# Patient Record
Sex: MALE | Race: WHITE | Employment: FULL TIME | URBAN - METROPOLITAN AREA
[De-identification: names, ages, dates, MRNs, and addresses within clinical notes are randomized per-mention and may not be internally consistent; named-entity substitution may affect disease eponyms.]

---

## 2017-05-26 ENCOUNTER — OFFICE VISIT (OUTPATIENT)
Dept: FAMILY MEDICINE CLINIC | Age: 62
End: 2017-05-26

## 2017-05-26 VITALS
SYSTOLIC BLOOD PRESSURE: 122 MMHG | RESPIRATION RATE: 16 BRPM | HEART RATE: 68 BPM | TEMPERATURE: 98.4 F | OXYGEN SATURATION: 98 % | BODY MASS INDEX: 27.63 KG/M2 | HEIGHT: 72 IN | DIASTOLIC BLOOD PRESSURE: 74 MMHG | WEIGHT: 204 LBS

## 2017-05-26 DIAGNOSIS — I10 ESSENTIAL HYPERTENSION: ICD-10-CM

## 2017-05-26 PROCEDURE — 99213 OFFICE O/P EST LOW 20 MIN: CPT | Performed by: FAMILY MEDICINE

## 2017-05-26 RX ORDER — HYDROCHLOROTHIAZIDE 25 MG/1
25 TABLET ORAL DAILY
Qty: 90 TABLET | Refills: 1 | Status: SHIPPED | OUTPATIENT
Start: 2017-05-26 | End: 2017-11-17 | Stop reason: SDUPTHER

## 2017-05-26 RX ORDER — ATENOLOL 100 MG/1
100 TABLET ORAL DAILY
Qty: 90 TABLET | Refills: 1 | Status: SHIPPED | OUTPATIENT
Start: 2017-05-26 | End: 2017-11-17 | Stop reason: SDUPTHER

## 2017-09-12 ENCOUNTER — OFFICE VISIT (OUTPATIENT)
Dept: FAMILY MEDICINE CLINIC | Age: 62
End: 2017-09-12

## 2017-09-12 VITALS
RESPIRATION RATE: 16 BRPM | WEIGHT: 204 LBS | HEIGHT: 72 IN | TEMPERATURE: 98 F | BODY MASS INDEX: 27.63 KG/M2 | OXYGEN SATURATION: 98 % | SYSTOLIC BLOOD PRESSURE: 118 MMHG | DIASTOLIC BLOOD PRESSURE: 80 MMHG | HEART RATE: 66 BPM

## 2017-09-12 DIAGNOSIS — K52.9 ACUTE GASTROENTERITIS: Primary | ICD-10-CM

## 2017-09-12 PROCEDURE — 99213 OFFICE O/P EST LOW 20 MIN: CPT | Performed by: FAMILY MEDICINE

## 2017-11-17 ENCOUNTER — OFFICE VISIT (OUTPATIENT)
Dept: FAMILY MEDICINE CLINIC | Age: 62
End: 2017-11-17

## 2017-11-17 VITALS
DIASTOLIC BLOOD PRESSURE: 80 MMHG | BODY MASS INDEX: 27.63 KG/M2 | HEIGHT: 72 IN | WEIGHT: 204 LBS | TEMPERATURE: 98.7 F | HEART RATE: 65 BPM | SYSTOLIC BLOOD PRESSURE: 124 MMHG | OXYGEN SATURATION: 97 % | RESPIRATION RATE: 16 BRPM

## 2017-11-17 DIAGNOSIS — I10 ESSENTIAL HYPERTENSION: ICD-10-CM

## 2017-11-17 PROCEDURE — 3017F COLORECTAL CA SCREEN DOC REV: CPT | Performed by: FAMILY MEDICINE

## 2017-11-17 PROCEDURE — G8427 DOCREV CUR MEDS BY ELIG CLIN: HCPCS | Performed by: FAMILY MEDICINE

## 2017-11-17 PROCEDURE — G8484 FLU IMMUNIZE NO ADMIN: HCPCS | Performed by: FAMILY MEDICINE

## 2017-11-17 PROCEDURE — G8417 CALC BMI ABV UP PARAM F/U: HCPCS | Performed by: FAMILY MEDICINE

## 2017-11-17 PROCEDURE — 4004F PT TOBACCO SCREEN RCVD TLK: CPT | Performed by: FAMILY MEDICINE

## 2017-11-17 PROCEDURE — 99213 OFFICE O/P EST LOW 20 MIN: CPT | Performed by: FAMILY MEDICINE

## 2017-11-17 RX ORDER — ATENOLOL 100 MG/1
100 TABLET ORAL DAILY
Qty: 90 TABLET | Refills: 3 | Status: SHIPPED | OUTPATIENT
Start: 2017-11-17 | End: 2018-11-15 | Stop reason: SDUPTHER

## 2017-11-17 RX ORDER — HYDROCHLOROTHIAZIDE 25 MG/1
25 TABLET ORAL DAILY
Qty: 90 TABLET | Refills: 3 | Status: SHIPPED | OUTPATIENT
Start: 2017-11-17 | End: 2018-11-15 | Stop reason: SDUPTHER

## 2018-04-03 ENCOUNTER — OFFICE VISIT (OUTPATIENT)
Dept: URGENT CARE | Age: 63
End: 2018-04-03

## 2018-04-03 VITALS
WEIGHT: 200 LBS | RESPIRATION RATE: 16 BRPM | DIASTOLIC BLOOD PRESSURE: 84 MMHG | TEMPERATURE: 97.7 F | HEIGHT: 72 IN | SYSTOLIC BLOOD PRESSURE: 126 MMHG | BODY MASS INDEX: 27.09 KG/M2 | OXYGEN SATURATION: 98 % | HEART RATE: 66 BPM

## 2018-04-03 DIAGNOSIS — R10.30 LOWER ABDOMINAL PAIN: Primary | ICD-10-CM

## 2018-04-03 LAB
APPEARANCE FLUID: CLEAR
BILIRUBIN, POC: NEGATIVE
BLOOD URINE, POC: NEGATIVE
CLARITY, POC: CLEAR
COLOR, POC: YELLOW
GLUCOSE URINE, POC: NEGATIVE
KETONES, POC: NEGATIVE
LEUKOCYTE EST, POC: NEGATIVE
NITRITE, POC: NEGATIVE
PH, POC: 7.5
PROTEIN, POC: NEGATIVE
SPECIFIC GRAVITY, POC: 1
UROBILINOGEN, POC: 0.2

## 2018-04-03 PROCEDURE — 99204 OFFICE O/P NEW MOD 45 MIN: CPT | Performed by: EMERGENCY MEDICINE

## 2018-04-03 PROCEDURE — G8427 DOCREV CUR MEDS BY ELIG CLIN: HCPCS | Performed by: EMERGENCY MEDICINE

## 2018-04-03 PROCEDURE — 81002 URINALYSIS NONAUTO W/O SCOPE: CPT | Performed by: EMERGENCY MEDICINE

## 2018-04-03 PROCEDURE — 4004F PT TOBACCO SCREEN RCVD TLK: CPT | Performed by: EMERGENCY MEDICINE

## 2018-04-03 PROCEDURE — 3017F COLORECTAL CA SCREEN DOC REV: CPT | Performed by: EMERGENCY MEDICINE

## 2018-04-03 PROCEDURE — G8417 CALC BMI ABV UP PARAM F/U: HCPCS | Performed by: EMERGENCY MEDICINE

## 2018-04-03 ASSESSMENT — ENCOUNTER SYMPTOMS
VOMITING: 0
DIARRHEA: 0
ABDOMINAL PAIN: 1
NAUSEA: 0

## 2018-05-03 ENCOUNTER — OFFICE VISIT (OUTPATIENT)
Dept: FAMILY MEDICINE CLINIC | Age: 63
End: 2018-05-03

## 2018-05-03 VITALS
SYSTOLIC BLOOD PRESSURE: 118 MMHG | HEIGHT: 72 IN | BODY MASS INDEX: 27.5 KG/M2 | OXYGEN SATURATION: 97 % | HEART RATE: 58 BPM | WEIGHT: 203 LBS | DIASTOLIC BLOOD PRESSURE: 72 MMHG

## 2018-05-03 DIAGNOSIS — I10 ESSENTIAL HYPERTENSION: Primary | ICD-10-CM

## 2018-05-03 DIAGNOSIS — K57.90 DIVERTICULOSIS OF INTESTINE WITHOUT BLEEDING, UNSPECIFIED INTESTINAL TRACT LOCATION: ICD-10-CM

## 2018-05-03 DIAGNOSIS — M71.21 BAKER'S CYST OF KNEE, RIGHT: ICD-10-CM

## 2018-05-03 DIAGNOSIS — Z72.0 TOBACCO USE: ICD-10-CM

## 2018-05-03 PROCEDURE — 99213 OFFICE O/P EST LOW 20 MIN: CPT | Performed by: PHYSICIAN ASSISTANT

## 2018-05-03 ASSESSMENT — ENCOUNTER SYMPTOMS
BLOOD IN STOOL: 0
VOMITING: 0
ABDOMINAL DISTENTION: 0
COUGH: 0
CHOKING: 0
WHEEZING: 0
CHEST TIGHTNESS: 0
APNEA: 0
RECTAL PAIN: 0
STRIDOR: 0
CONSTIPATION: 0
NAUSEA: 0
DIARRHEA: 0
SHORTNESS OF BREATH: 0
ABDOMINAL PAIN: 1

## 2018-05-03 ASSESSMENT — PATIENT HEALTH QUESTIONNAIRE - PHQ9
SUM OF ALL RESPONSES TO PHQ QUESTIONS 1-9: 0
SUM OF ALL RESPONSES TO PHQ9 QUESTIONS 1 & 2: 0
1. LITTLE INTEREST OR PLEASURE IN DOING THINGS: 0
2. FEELING DOWN, DEPRESSED OR HOPELESS: 0

## 2018-11-15 ENCOUNTER — OFFICE VISIT (OUTPATIENT)
Dept: FAMILY MEDICINE CLINIC | Age: 63
End: 2018-11-15
Payer: COMMERCIAL

## 2018-11-15 VITALS
OXYGEN SATURATION: 97 % | SYSTOLIC BLOOD PRESSURE: 110 MMHG | BODY MASS INDEX: 28.17 KG/M2 | WEIGHT: 208 LBS | HEART RATE: 65 BPM | DIASTOLIC BLOOD PRESSURE: 84 MMHG | HEIGHT: 72 IN

## 2018-11-15 DIAGNOSIS — R73.01 ELEVATED FASTING GLUCOSE: ICD-10-CM

## 2018-11-15 DIAGNOSIS — I10 ESSENTIAL HYPERTENSION: Primary | ICD-10-CM

## 2018-11-15 LAB — HBA1C MFR BLD: 5.6 %

## 2018-11-15 PROCEDURE — 3017F COLORECTAL CA SCREEN DOC REV: CPT | Performed by: PHYSICIAN ASSISTANT

## 2018-11-15 PROCEDURE — G8427 DOCREV CUR MEDS BY ELIG CLIN: HCPCS | Performed by: PHYSICIAN ASSISTANT

## 2018-11-15 PROCEDURE — 83036 HEMOGLOBIN GLYCOSYLATED A1C: CPT | Performed by: PHYSICIAN ASSISTANT

## 2018-11-15 PROCEDURE — G8484 FLU IMMUNIZE NO ADMIN: HCPCS | Performed by: PHYSICIAN ASSISTANT

## 2018-11-15 PROCEDURE — 99213 OFFICE O/P EST LOW 20 MIN: CPT | Performed by: PHYSICIAN ASSISTANT

## 2018-11-15 PROCEDURE — G8417 CALC BMI ABV UP PARAM F/U: HCPCS | Performed by: PHYSICIAN ASSISTANT

## 2018-11-15 PROCEDURE — 4004F PT TOBACCO SCREEN RCVD TLK: CPT | Performed by: PHYSICIAN ASSISTANT

## 2018-11-15 RX ORDER — ATENOLOL 100 MG/1
100 TABLET ORAL DAILY
Qty: 90 TABLET | Refills: 1 | Status: SHIPPED | OUTPATIENT
Start: 2018-11-15 | End: 2019-05-28 | Stop reason: SDUPTHER

## 2018-11-15 RX ORDER — HYDROCHLOROTHIAZIDE 25 MG/1
25 TABLET ORAL DAILY
Qty: 90 TABLET | Refills: 1 | Status: SHIPPED | OUTPATIENT
Start: 2018-11-15 | End: 2019-05-28 | Stop reason: SDUPTHER

## 2018-11-15 ASSESSMENT — ENCOUNTER SYMPTOMS
SHORTNESS OF BREATH: 0
COUGH: 0
CHEST TIGHTNESS: 0

## 2019-03-27 ENCOUNTER — OFFICE VISIT (OUTPATIENT)
Dept: FAMILY MEDICINE CLINIC | Age: 64
End: 2019-03-27
Payer: COMMERCIAL

## 2019-03-27 VITALS
DIASTOLIC BLOOD PRESSURE: 80 MMHG | HEART RATE: 65 BPM | WEIGHT: 205.2 LBS | OXYGEN SATURATION: 98 % | BODY MASS INDEX: 27.79 KG/M2 | SYSTOLIC BLOOD PRESSURE: 114 MMHG | HEIGHT: 72 IN

## 2019-03-27 DIAGNOSIS — M54.50 ACUTE BILATERAL LOW BACK PAIN WITHOUT SCIATICA: ICD-10-CM

## 2019-03-27 DIAGNOSIS — R25.1 TREMOR: Primary | ICD-10-CM

## 2019-03-27 PROCEDURE — 99213 OFFICE O/P EST LOW 20 MIN: CPT | Performed by: PHYSICIAN ASSISTANT

## 2019-03-27 PROCEDURE — G0444 DEPRESSION SCREEN ANNUAL: HCPCS | Performed by: PHYSICIAN ASSISTANT

## 2019-03-27 RX ORDER — METHYLPREDNISOLONE 4 MG/1
TABLET ORAL
Qty: 1 KIT | Refills: 0 | Status: SHIPPED | OUTPATIENT
Start: 2019-03-27 | End: 2019-04-02

## 2019-03-27 ASSESSMENT — ENCOUNTER SYMPTOMS
BOWEL INCONTINENCE: 0
CHEST TIGHTNESS: 0
ABDOMINAL PAIN: 0
BACK PAIN: 1
SHORTNESS OF BREATH: 0

## 2019-03-27 ASSESSMENT — PATIENT HEALTH QUESTIONNAIRE - PHQ9
9. THOUGHTS THAT YOU WOULD BE BETTER OFF DEAD, OR OF HURTING YOURSELF: 0
SUM OF ALL RESPONSES TO PHQ9 QUESTIONS 1 & 2: 3
5. POOR APPETITE OR OVEREATING: 0
10. IF YOU CHECKED OFF ANY PROBLEMS, HOW DIFFICULT HAVE THESE PROBLEMS MADE IT FOR YOU TO DO YOUR WORK, TAKE CARE OF THINGS AT HOME, OR GET ALONG WITH OTHER PEOPLE: 0
3. TROUBLE FALLING OR STAYING ASLEEP: 0
7. TROUBLE CONCENTRATING ON THINGS, SUCH AS READING THE NEWSPAPER OR WATCHING TELEVISION: 0
8. MOVING OR SPEAKING SO SLOWLY THAT OTHER PEOPLE COULD HAVE NOTICED. OR THE OPPOSITE, BEING SO FIGETY OR RESTLESS THAT YOU HAVE BEEN MOVING AROUND A LOT MORE THAN USUAL: 0
6. FEELING BAD ABOUT YOURSELF - OR THAT YOU ARE A FAILURE OR HAVE LET YOURSELF OR YOUR FAMILY DOWN: 3
4. FEELING TIRED OR HAVING LITTLE ENERGY: 0
SUM OF ALL RESPONSES TO PHQ QUESTIONS 1-9: 6
1. LITTLE INTEREST OR PLEASURE IN DOING THINGS: 0
2. FEELING DOWN, DEPRESSED OR HOPELESS: 3
SUM OF ALL RESPONSES TO PHQ QUESTIONS 1-9: 6

## 2019-05-28 ENCOUNTER — INITIAL CONSULT (OUTPATIENT)
Dept: NEUROLOGY | Age: 64
End: 2019-05-28
Payer: COMMERCIAL

## 2019-05-28 VITALS
WEIGHT: 206 LBS | HEIGHT: 72 IN | HEART RATE: 68 BPM | SYSTOLIC BLOOD PRESSURE: 127 MMHG | DIASTOLIC BLOOD PRESSURE: 79 MMHG | BODY MASS INDEX: 27.9 KG/M2 | OXYGEN SATURATION: 95 %

## 2019-05-28 DIAGNOSIS — I10 HTN (HYPERTENSION), BENIGN: ICD-10-CM

## 2019-05-28 DIAGNOSIS — I10 ESSENTIAL HYPERTENSION: Primary | ICD-10-CM

## 2019-05-28 DIAGNOSIS — I10 ESSENTIAL HYPERTENSION: ICD-10-CM

## 2019-05-28 DIAGNOSIS — G25.0 ESSENTIAL TREMOR: Primary | ICD-10-CM

## 2019-05-28 PROCEDURE — 99203 OFFICE O/P NEW LOW 30 MIN: CPT | Performed by: PSYCHIATRY & NEUROLOGY

## 2019-05-28 RX ORDER — HYDROCHLOROTHIAZIDE 25 MG/1
25 TABLET ORAL DAILY
Qty: 90 TABLET | Refills: 1 | Status: SHIPPED | OUTPATIENT
Start: 2019-05-28 | End: 2019-06-10 | Stop reason: SDUPTHER

## 2019-05-28 RX ORDER — PRIMIDONE 50 MG/1
50 TABLET ORAL 2 TIMES DAILY
Qty: 60 TABLET | Refills: 2 | Status: SHIPPED | OUTPATIENT
Start: 2019-05-28 | End: 2019-12-06

## 2019-05-28 RX ORDER — ATENOLOL 100 MG/1
100 TABLET ORAL DAILY
Qty: 90 TABLET | Refills: 1 | Status: SHIPPED | OUTPATIENT
Start: 2019-05-28 | End: 2019-06-10 | Stop reason: SDUPTHER

## 2019-05-28 NOTE — PROGRESS NOTES
The patient is a 66y.o. years old male who  was referred by JEAN-PAUL Price for consultation regarding chronic tremors. HPI:  The patient describes hx of chronic tremors. Onset about five years ago. He describes daily spells of hands more left than right tremors and sometimes in his legs. Sx are daily and can last for few minutes. Triggers include activity, fine motor skills and stress. Degree is moderate to severe. Can affect his ADL. It can affect his eating or drinking. No other associated symptoms or triggers. He takes Atenolol for HTN. He did no try any other medication for his tremors. No FH of tremors. No resting tremors. He drinks ETOH socially and can help with his tremors. He denies any sleep issues, falling, LOC or ABEL. No recent fever or chills. No headaches, CP, SOB, memory loss or  sleep issues. Recent blood testing according to the patient was wnl. Other ROS was negative. Past Medical History:   Diagnosis Date    Baker's cyst of knee, right     Diverticulosis     Hypertension     Nocturia     Tobacco use      Prior to Visit Medications    Medication Sig Taking? Authorizing Provider   primidone (MYSOLINE) 50 MG tablet Take 1 tablet by mouth 2 times daily Take one tab at night for five days and then twice daily. Yes Nae Julio MD   atenolol (TENORMIN) 100 MG tablet Take 1 tablet by mouth daily Yes JEAN-PAUL Lopez   hydrochlorothiazide (HYDRODIURIL) 25 MG tablet Take 1 tablet by mouth daily Yes JEAN-PAUL Lopez     No Known Allergies  Social History     Tobacco Use    Smoking status: Former Smoker     Packs/day: 0.25     Years: 20.00     Pack years: 5.00     Types: Cigarettes    Smokeless tobacco: Never Used   Substance Use Topics    Alcohol use:  Yes     Alcohol/week: 7.2 oz     Types: 12 Cans of beer per week     Comment: 12/wk     Family History   Problem Relation Age of Onset    Cancer Mother         breast    Diabetes Father     Coronary Art Dis Father  Diabetes Paternal Grandmother      Past Surgical History:   Procedure Laterality Date    COLONOSCOPY  9-    3 polyps    TONSILLECTOMY         ROS : A 10-12 system review of constitutional, cardiovascular, respiratory, musculoskeletal, endocrine, skin, SHEENT, genitourinary, psychiatric and neurologic systems was obtained and updated today and is unremarkable except as mentioned in my HPI        Exam:   Constitutional:   Vitals:    05/28/19 1225   BP: 127/79   Pulse: 68   SpO2: 95%   Weight: 206 lb (93.4 kg)   Height: 6' (1.829 m)       General appearance:  Normal development and appear in no acute distress. Eye: No icterus. Fundus: NT  Neck: supple  Cardiovascular:          No lower leg edema with good pulsation. Mental Status:   Oriented to person, place, problem, and time. Memory: Good immediate recall. Intact remote memory  Normal attention span and concentration. Language: intact naming, repeating and fluency   Good fund of Knowledge. Aware of current events and vocabulary   Cranial Nerves:   II: Visual fields: Full to confrontation and nl VA. Pupils: equal, round, reactive to light  III,IV,VI: Extra Ocular Movements are intact. No nystagmus  V: Facial sensation is intact to pin prick and light touch  VII: Facial strength and movements: intact and symmetric  VIII: Hearing: Intact to finger rub bilaterally  IX: Palate elevation is symmetric  XI: Shoulder shrug is intact  XII: Tongue movements are normal  Musculoskeletal: 5/5 in all 4 extremities. Tone: Normal tone. Reflexes: Bilateral biceps 2/4, triceps 2/4, brachial radialis 2/4, knee 2/4 and ankle 2/4. Planters: flexor bilaterally. Coordination: no pronator drift, no dysmetria with FNF in upper extremities. Normal REM. Postural and intentional hand tremors more left than right. No cog wheeling. No resting tremors. Sensation: normal to all modalities in both arms and legs.   Gait/Posture: steady gait and normal posturing and

## 2019-05-28 NOTE — TELEPHONE ENCOUNTER
.  Last office visit 3/27/2019     Last written 11/15/18 #90 1 refill    Next office visit scheduled none    Requested Prescriptions     Pending Prescriptions Disp Refills    atenolol (TENORMIN) 100 MG tablet 90 tablet 1     Sig: Take 1 tablet by mouth daily

## 2019-05-28 NOTE — LETTER
Marylee Arabian, MD    [facility]  [address], [address] [place] [address]. [address] [place] [address]    [phone] (Phone)  [phone] (Fax)    Dear JEAN-PAUL De Oliveira    I had the pleasure of seeing your patient Meaghan Santoyo  1955 today. I have attached a detailed summary below:    The patient is a 66y.o. years old male who  was referred by JEAN-PAUL Price for consultation regarding chronic tremors. HPI:  The patient describes hx of chronic tremors. Onset about five years ago. He describes daily spells of hands more left than right tremors and sometimes in his legs. Sx are daily and can last for few minutes. Triggers include activity, fine motor skills and stress. Degree is moderate to severe. Can affect his ADL. It can affect his eating or drinking. No other associated symptoms or triggers. He takes Atenolol for HTN. He did no try any other medication for his tremors. No FH of tremors. No resting tremors. He drinks ETOH socially and can help with his tremors. He denies any sleep issues, falling, LOC or ABEL. No recent fever or chills. No headaches, CP, SOB, memory loss or  sleep issues. Recent blood testing according to the patient was wnl. Other ROS was negative. Past Medical History:   Diagnosis Date    Baker's cyst of knee, right     Diverticulosis     Hypertension     Nocturia     Tobacco use      Prior to Visit Medications    Medication Sig Taking? Authorizing Provider   primidone (MYSOLINE) 50 MG tablet Take 1 tablet by mouth 2 times daily Take one tab at night for five days and then twice daily.  Yes Nae Julio MD   atenolol (TENORMIN) 100 MG tablet Take 1 tablet by mouth daily Yes JEAN-PAUL Lopez   hydrochlorothiazide (HYDRODIURIL) 25 MG tablet Take 1 tablet by mouth daily Yes JEAN-PAUL Lopez     No Known Allergies  Social History     Tobacco Use    Smoking status: Former Smoker     Packs/day: 0.25 Years: 20.00     Pack years: 5.00     Types: Cigarettes    Smokeless tobacco: Never Used   Substance Use Topics    Alcohol use: Yes     Alcohol/week: 7.2 oz     Types: 12 Cans of beer per week     Comment: 12/wk     Family History   Problem Relation Age of Onset    Cancer Mother         breast    Diabetes Father     Coronary Art Dis Father     Diabetes Paternal Grandmother      Past Surgical History:   Procedure Laterality Date    COLONOSCOPY  9-    3 polyps    TONSILLECTOMY         ROS : A 10-12 system review of constitutional, cardiovascular, respiratory, musculoskeletal, endocrine, skin, SHEENT, genitourinary, psychiatric and neurologic systems was obtained and updated today and is unremarkable except as mentioned in my HPI        Exam:   Constitutional:   Vitals:    05/28/19 1225   BP: 127/79   Pulse: 68   SpO2: 95%   Weight: 206 lb (93.4 kg)   Height: 6' (1.829 m)       General appearance:  Normal development and appear in no acute distress. Eye: No icterus. Fundus: NT  Neck: supple  Cardiovascular:          No lower leg edema with good pulsation. Mental Status:   Oriented to person, place, problem, and time. Memory: Good immediate recall. Intact remote memory  Normal attention span and concentration. Language: intact naming, repeating and fluency   Good fund of Knowledge. Aware of current events and vocabulary   Cranial Nerves:   II: Visual fields: Full to confrontation and nl VA. Pupils: equal, round, reactive to light  III,IV,VI: Extra Ocular Movements are intact. No nystagmus  V: Facial sensation is intact to pin prick and light touch  VII: Facial strength and movements: intact and symmetric  VIII: Hearing: Intact to finger rub bilaterally  IX: Palate elevation is symmetric  XI: Shoulder shrug is intact  XII: Tongue movements are normal  Musculoskeletal: 5/5 in all 4 extremities. Tone: Normal tone.    Reflexes: Bilateral biceps 2/4, triceps 2/4, brachial radialis 2/4, knee

## 2019-05-28 NOTE — TELEPHONE ENCOUNTER
Please let the pt know that his refills have been sent in. He needs to complete lab work sometime in the next month to check kidney functioning as he is due. Lab order placed.  Thank you

## 2019-06-10 ENCOUNTER — OFFICE VISIT (OUTPATIENT)
Dept: FAMILY MEDICINE CLINIC | Age: 64
End: 2019-06-10
Payer: COMMERCIAL

## 2019-06-10 VITALS
WEIGHT: 207 LBS | OXYGEN SATURATION: 99 % | HEART RATE: 80 BPM | DIASTOLIC BLOOD PRESSURE: 84 MMHG | BODY MASS INDEX: 28.07 KG/M2 | SYSTOLIC BLOOD PRESSURE: 122 MMHG

## 2019-06-10 DIAGNOSIS — H10.13 ALLERGIC CONJUNCTIVITIS OF BOTH EYES: ICD-10-CM

## 2019-06-10 DIAGNOSIS — E78.5 HYPERLIPIDEMIA, UNSPECIFIED HYPERLIPIDEMIA TYPE: ICD-10-CM

## 2019-06-10 DIAGNOSIS — I10 ESSENTIAL HYPERTENSION: Primary | ICD-10-CM

## 2019-06-10 PROCEDURE — 99213 OFFICE O/P EST LOW 20 MIN: CPT | Performed by: PHYSICIAN ASSISTANT

## 2019-06-10 RX ORDER — ATENOLOL 100 MG/1
100 TABLET ORAL DAILY
Qty: 90 TABLET | Refills: 1 | Status: SHIPPED | OUTPATIENT
Start: 2019-06-10 | End: 2019-12-06 | Stop reason: SDUPTHER

## 2019-06-10 RX ORDER — OLOPATADINE HYDROCHLORIDE 2 MG/ML
1 SOLUTION/ DROPS OPHTHALMIC DAILY
Qty: 1 BOTTLE | Refills: 3 | Status: SHIPPED | OUTPATIENT
Start: 2019-06-10 | End: 2019-12-06

## 2019-06-10 RX ORDER — HYDROCHLOROTHIAZIDE 25 MG/1
25 TABLET ORAL DAILY
Qty: 90 TABLET | Refills: 1 | Status: SHIPPED | OUTPATIENT
Start: 2019-06-10 | End: 2019-10-25 | Stop reason: SDUPTHER

## 2019-06-10 ASSESSMENT — ENCOUNTER SYMPTOMS
EYE ITCHING: 1
COLOR CHANGE: 0
SHORTNESS OF BREATH: 0
EYE REDNESS: 1
PHOTOPHOBIA: 0
EYE DISCHARGE: 1
EYE PAIN: 0

## 2019-06-10 NOTE — PROGRESS NOTES
6/10/2019     Suri Queen (:  1955) is a 66 y.o. male, here for evaluation of the following medical concerns:    HPI  Hypertension:  Home blood pressure monitoring: Yes - 120's/80's. He is adherent to a low sodium diet. Patient denies chest pain, shortness of breath, headache, lightheadedness, blurred vision, peripheral edema, palpitations and fatigue. Antihypertensive medication side effects: no medication side effects noted. Use of agents associated with hypertension: none. Sodium (mmol/L)   Date Value   2018 135 (L)    BUN (mg/dL)   Date Value   2018 19    Glucose (mg/dL)   Date Value   2018 123 (H)      Potassium (mmol/L)   Date Value   2018 3.8    CREATININE (mg/dL)   Date Value   2018 1.0         Bilateral eye itching and drainage: Worse in the left eye. Both have been draining clear fluid for one month. There is crusting in the AM. Itches but no pain. No change in vision. Using otc eye drops, has not been using daily. No relief. Review of Systems   Constitutional: Negative for activity change, appetite change, fever and unexpected weight change. Eyes: Positive for discharge, redness and itching. Negative for photophobia, pain and visual disturbance. Respiratory: Negative for shortness of breath. Cardiovascular: Negative for chest pain and palpitations. Skin: Negative for color change. Neurological: Negative for dizziness, light-headedness and headaches. Hematological: Negative for adenopathy. Prior to Visit Medications    Medication Sig Taking?  Authorizing Provider   atenolol (TENORMIN) 100 MG tablet Take 1 tablet by mouth daily Yes JEAN-PAUL Cottrell   hydrochlorothiazide (HYDRODIURIL) 25 MG tablet Take 1 tablet by mouth daily Yes JEAN-PAUL Cottrell   olopatadine (PATADAY) 0.2 % SOLN ophthalmic solution Place 1 drop into both eyes daily Yes JEAN-PAUL Cottrell   primidone (MYSOLINE) 50 MG tablet Take 1 tablet by mouth 2 times daily Take one tab at night for five days and then twice daily. Yes Hernando Almeida MD        Social History     Tobacco Use    Smoking status: Former Smoker     Packs/day: 0.25     Years: 20.00     Pack years: 5.00     Types: Cigarettes    Smokeless tobacco: Never Used   Substance Use Topics    Alcohol use: Yes     Alcohol/week: 7.2 oz     Types: 12 Cans of beer per week     Comment: 12/wk        Vitals:    06/10/19 1015   BP: 122/84   Pulse: 80   SpO2: 99%   Weight: 207 lb (93.9 kg)     Estimated body mass index is 28.07 kg/m² as calculated from the following:    Height as of 5/28/19: 6' (1.829 m). Weight as of this encounter: 207 lb (93.9 kg). Physical Exam   Constitutional: He appears well-developed and well-nourished. HENT:   Head: Normocephalic and atraumatic. Mouth/Throat: Oropharynx is clear and moist.   Eyes: Pupils are equal, round, and reactive to light. EOM and lids are normal. Right eye exhibits no chemosis, no discharge, no exudate and no hordeolum. No foreign body present in the right eye. Left eye exhibits no chemosis, no discharge, no exudate and no hordeolum. No foreign body present in the left eye. Right conjunctiva is injected. Left conjunctiva is injected. Cardiovascular: Normal rate, regular rhythm and normal heart sounds. No carotid bruits noted   Pulmonary/Chest: Effort normal and breath sounds normal. No stridor. No respiratory distress. He has no wheezes. He has no rales. He exhibits no tenderness. Vitals reviewed. ASSESSMENT/PLAN:  1. Essential hypertension  -  Pt reports that pharmacy only gave him 9 tablets of one of his medications. He will call and discuss with pharmacy and let me know if a new Rx needs filled  - atenolol (TENORMIN) 100 MG tablet; Take 1 tablet by mouth daily  Dispense: 90 tablet; Refill: 1  - hydrochlorothiazide (HYDRODIURIL) 25 MG tablet; Take 1 tablet by mouth daily  Dispense: 90 tablet;  Refill: 1  - CBC; Future  - COMPREHENSIVE METABOLIC PANEL; Future    2. Allergic conjunctivitis of both eyes  -  Generic Pataday sent in, one drop per eye once per day. Call if symptoms do not improve    3. Hyperlipidemia, unspecified hyperlipidemia type  - LIPID PANEL; Future      Return in about 6 months (around 12/10/2019) for HTN. An electronic signature was used to authenticate this note.     --JEAN-PAUL Razo on 6/10/2019 at 10:34 AM

## 2019-10-25 DIAGNOSIS — I10 ESSENTIAL HYPERTENSION: ICD-10-CM

## 2019-10-25 RX ORDER — HYDROCHLOROTHIAZIDE 25 MG/1
25 TABLET ORAL DAILY
Qty: 90 TABLET | Refills: 0 | Status: SHIPPED | OUTPATIENT
Start: 2019-10-25 | End: 2019-12-06 | Stop reason: SDUPTHER

## 2019-12-06 ENCOUNTER — TELEPHONE (OUTPATIENT)
Dept: FAMILY MEDICINE CLINIC | Age: 64
End: 2019-12-06

## 2019-12-06 ENCOUNTER — OFFICE VISIT (OUTPATIENT)
Dept: FAMILY MEDICINE CLINIC | Age: 64
End: 2019-12-06
Payer: COMMERCIAL

## 2019-12-06 VITALS
DIASTOLIC BLOOD PRESSURE: 74 MMHG | HEART RATE: 65 BPM | SYSTOLIC BLOOD PRESSURE: 114 MMHG | BODY MASS INDEX: 27.94 KG/M2 | OXYGEN SATURATION: 99 % | WEIGHT: 206 LBS

## 2019-12-06 DIAGNOSIS — I10 ESSENTIAL HYPERTENSION: Primary | ICD-10-CM

## 2019-12-06 DIAGNOSIS — E78.5 HYPERLIPIDEMIA, UNSPECIFIED HYPERLIPIDEMIA TYPE: ICD-10-CM

## 2019-12-06 DIAGNOSIS — I10 ESSENTIAL HYPERTENSION: ICD-10-CM

## 2019-12-06 DIAGNOSIS — H10.9 BACTERIAL CONJUNCTIVITIS OF LEFT EYE: ICD-10-CM

## 2019-12-06 LAB
A/G RATIO: 1.9 (ref 1.1–2.2)
ALBUMIN SERPL-MCNC: 4.6 G/DL (ref 3.4–5)
ALP BLD-CCNC: 78 U/L (ref 40–129)
ALT SERPL-CCNC: 22 U/L (ref 10–40)
ANION GAP SERPL CALCULATED.3IONS-SCNC: 15 MMOL/L (ref 3–16)
AST SERPL-CCNC: 16 U/L (ref 15–37)
BILIRUB SERPL-MCNC: 0.7 MG/DL (ref 0–1)
BUN BLDV-MCNC: 11 MG/DL (ref 7–20)
CALCIUM SERPL-MCNC: 9.7 MG/DL (ref 8.3–10.6)
CHLORIDE BLD-SCNC: 98 MMOL/L (ref 99–110)
CHOLESTEROL, TOTAL: 175 MG/DL (ref 0–199)
CO2: 26 MMOL/L (ref 21–32)
CREAT SERPL-MCNC: 1 MG/DL (ref 0.8–1.3)
GFR AFRICAN AMERICAN: >60
GFR NON-AFRICAN AMERICAN: >60
GLOBULIN: 2.4 G/DL
GLUCOSE BLD-MCNC: 105 MG/DL (ref 70–99)
HCT VFR BLD CALC: 55.2 % (ref 40.5–52.5)
HDLC SERPL-MCNC: 39 MG/DL (ref 40–60)
HEMOGLOBIN: 18.7 G/DL (ref 13.5–17.5)
LDL CHOLESTEROL CALCULATED: 104 MG/DL
MCH RBC QN AUTO: 31 PG (ref 26–34)
MCHC RBC AUTO-ENTMCNC: 33.8 G/DL (ref 31–36)
MCV RBC AUTO: 91.5 FL (ref 80–100)
PDW BLD-RTO: 14 % (ref 12.4–15.4)
PLATELET # BLD: 205 K/UL (ref 135–450)
PMV BLD AUTO: 9.1 FL (ref 5–10.5)
POTASSIUM SERPL-SCNC: 4.3 MMOL/L (ref 3.5–5.1)
RBC # BLD: 6.03 M/UL (ref 4.2–5.9)
SODIUM BLD-SCNC: 139 MMOL/L (ref 136–145)
TOTAL PROTEIN: 7 G/DL (ref 6.4–8.2)
TRIGL SERPL-MCNC: 158 MG/DL (ref 0–150)
VLDLC SERPL CALC-MCNC: 32 MG/DL
WBC # BLD: 5.9 K/UL (ref 4–11)

## 2019-12-06 PROCEDURE — 99213 OFFICE O/P EST LOW 20 MIN: CPT | Performed by: PHYSICIAN ASSISTANT

## 2019-12-06 RX ORDER — CIPROFLOXACIN HYDROCHLORIDE 3.5 MG/ML
1 SOLUTION/ DROPS TOPICAL
Qty: 84 DROP | Refills: 0 | Status: SHIPPED | OUTPATIENT
Start: 2019-12-06 | End: 2019-12-13

## 2019-12-06 RX ORDER — ATENOLOL 100 MG/1
100 TABLET ORAL DAILY
Qty: 90 TABLET | Refills: 1 | Status: SHIPPED | OUTPATIENT
Start: 2019-12-06 | End: 2020-06-15 | Stop reason: SDUPTHER

## 2019-12-06 RX ORDER — HYDROCHLOROTHIAZIDE 25 MG/1
25 TABLET ORAL DAILY
Qty: 90 TABLET | Refills: 1 | Status: SHIPPED | OUTPATIENT
Start: 2019-12-06 | End: 2020-06-15 | Stop reason: SDUPTHER

## 2019-12-06 ASSESSMENT — ENCOUNTER SYMPTOMS
EYE REDNESS: 1
BLOOD IN STOOL: 0
EYE PAIN: 0
PHOTOPHOBIA: 0
COUGH: 0
SHORTNESS OF BREATH: 0
RHINORRHEA: 0
DOUBLE VISION: 0
EYE DISCHARGE: 1
ABDOMINAL PAIN: 0
EYE ITCHING: 1

## 2019-12-09 RX ORDER — ATORVASTATIN CALCIUM 40 MG/1
40 TABLET, FILM COATED ORAL DAILY
Qty: 90 TABLET | Refills: 1 | Status: SHIPPED | OUTPATIENT
Start: 2019-12-09 | End: 2021-01-21

## 2020-06-15 RX ORDER — HYDROCHLOROTHIAZIDE 25 MG/1
25 TABLET ORAL DAILY
Qty: 90 TABLET | Refills: 1 | Status: SHIPPED | OUTPATIENT
Start: 2020-06-15 | End: 2020-07-16 | Stop reason: SDUPTHER

## 2020-06-15 RX ORDER — ATENOLOL 100 MG/1
100 TABLET ORAL DAILY
Qty: 90 TABLET | Refills: 1 | Status: SHIPPED | OUTPATIENT
Start: 2020-06-15 | End: 2020-07-16 | Stop reason: SDUPTHER

## 2020-06-15 NOTE — TELEPHONE ENCOUNTER
Last office visit 12/6/2019     Last written 12-6-2019 x 1 refill    Next office visit scheduled Not scheduled    Requested Prescriptions     Pending Prescriptions Disp Refills    atenolol (TENORMIN) 100 MG tablet 90 tablet 1     Sig: Take 1 tablet by mouth daily

## 2020-07-16 ENCOUNTER — TELEPHONE (OUTPATIENT)
Dept: FAMILY MEDICINE CLINIC | Age: 65
End: 2020-07-16

## 2020-07-16 RX ORDER — ATENOLOL 100 MG/1
100 TABLET ORAL DAILY
Qty: 90 TABLET | Refills: 1 | Status: SHIPPED | OUTPATIENT
Start: 2020-07-16 | End: 2021-01-11 | Stop reason: SDUPTHER

## 2020-07-16 RX ORDER — HYDROCHLOROTHIAZIDE 25 MG/1
25 TABLET ORAL DAILY
Qty: 90 TABLET | Refills: 1 | Status: SHIPPED | OUTPATIENT
Start: 2020-07-16 | End: 2021-01-11 | Stop reason: SDUPTHER

## 2020-07-16 NOTE — TELEPHONE ENCOUNTER
Refill Request     Last Seen: 12/6/2019    Hydrochlorothiazide Last Written: 6/15/2020  Atenolol Last Written: 6/15/2020    Next Appointment:   No future appointments.           Requested Prescriptions     Pending Prescriptions Disp Refills    hydroCHLOROthiazide (HYDRODIURIL) 25 MG tablet 90 tablet 1     Sig: Take 1 tablet by mouth daily    atenolol (TENORMIN) 100 MG tablet 90 tablet 1     Sig: Take 1 tablet by mouth daily

## 2020-07-16 NOTE — TELEPHONE ENCOUNTER
Patient requesting a medication refill. Medication: hydroCHLOROthiazide (HYDRODIURIL) 25 MG   Pharmacy:   [facility], [address] [place] [address]  Last office visit: 12/6/2019  Next office visit: Visit date not found    Patient requesting a medication refill.   Medication: atenolol (TENORMIN) 100 MG  Pharmacy:   [facility]  Last office visit: 12/6/2019  Next office visit: Visit date not found

## 2020-11-03 PROBLEM — I10 HYPERTENSION: Status: RESOLVED | Noted: 2020-11-03 | Resolved: 2020-11-03

## 2020-12-03 ENCOUNTER — TELEPHONE (OUTPATIENT)
Dept: FAMILY MEDICINE CLINIC | Age: 65
End: 2020-12-03

## 2020-12-03 RX ORDER — KETOCONAZOLE 20 MG/G
CREAM TOPICAL
Qty: 30 G | Refills: 0 | Status: SHIPPED | OUTPATIENT
Start: 2020-12-03 | End: 2020-12-11

## 2020-12-03 NOTE — TELEPHONE ENCOUNTER
Pt. Complaining of foreskin of his penis is inflamed and itching. Area is sore. Has tried OTC fungal cream and it hasn't helped. Symptoms x 2 weeks. Not aware of any insect bite in that area.   Please advise

## 2020-12-03 NOTE — TELEPHONE ENCOUNTER
Ketoconazole cream sent to pharmacy.  If no improvement in two weeks or worsening symptoms please call to schedule an office visit

## 2020-12-09 ENCOUNTER — TELEPHONE (OUTPATIENT)
Dept: FAMILY MEDICINE CLINIC | Age: 65
End: 2020-12-09

## 2020-12-09 NOTE — TELEPHONE ENCOUNTER
Pt called about Nizoral cream pt stated that it doesn't seem to be helping and was wondering if there was something else that she can give him to help.  Please Advise

## 2020-12-11 ENCOUNTER — OFFICE VISIT (OUTPATIENT)
Dept: FAMILY MEDICINE CLINIC | Age: 65
End: 2020-12-11
Payer: COMMERCIAL

## 2020-12-11 VITALS
SYSTOLIC BLOOD PRESSURE: 110 MMHG | RESPIRATION RATE: 16 BRPM | WEIGHT: 207.2 LBS | HEART RATE: 76 BPM | BODY MASS INDEX: 28.06 KG/M2 | DIASTOLIC BLOOD PRESSURE: 60 MMHG | HEIGHT: 72 IN | OXYGEN SATURATION: 95 % | TEMPERATURE: 97.9 F

## 2020-12-11 PROCEDURE — 99213 OFFICE O/P EST LOW 20 MIN: CPT | Performed by: NURSE PRACTITIONER

## 2020-12-11 RX ORDER — FLUCONAZOLE 150 MG/1
150 TABLET ORAL WEEKLY
Qty: 3 TABLET | Refills: 0 | Status: SHIPPED | OUTPATIENT
Start: 2020-12-11 | End: 2021-01-14 | Stop reason: ALTCHOICE

## 2020-12-11 ASSESSMENT — PATIENT HEALTH QUESTIONNAIRE - PHQ9
2. FEELING DOWN, DEPRESSED OR HOPELESS: 0
SUM OF ALL RESPONSES TO PHQ QUESTIONS 1-9: 0
SUM OF ALL RESPONSES TO PHQ9 QUESTIONS 1 & 2: 0
SUM OF ALL RESPONSES TO PHQ QUESTIONS 1-9: 0
1. LITTLE INTEREST OR PLEASURE IN DOING THINGS: 0
SUM OF ALL RESPONSES TO PHQ QUESTIONS 1-9: 0

## 2020-12-21 DIAGNOSIS — B36.9 FUNGAL DERMATITIS: ICD-10-CM

## 2021-01-11 DIAGNOSIS — I10 ESSENTIAL HYPERTENSION: ICD-10-CM

## 2021-01-11 RX ORDER — ATENOLOL 100 MG/1
100 TABLET ORAL DAILY
Qty: 90 TABLET | Refills: 0 | Status: SHIPPED | OUTPATIENT
Start: 2021-01-11 | End: 2021-01-14 | Stop reason: SDUPTHER

## 2021-01-11 RX ORDER — HYDROCHLOROTHIAZIDE 25 MG/1
25 TABLET ORAL DAILY
Qty: 90 TABLET | Refills: 0 | Status: SHIPPED | OUTPATIENT
Start: 2021-01-11 | End: 2021-01-14 | Stop reason: SDUPTHER

## 2021-01-14 ENCOUNTER — OFFICE VISIT (OUTPATIENT)
Dept: FAMILY MEDICINE CLINIC | Age: 66
End: 2021-01-14
Payer: COMMERCIAL

## 2021-01-14 VITALS
HEART RATE: 67 BPM | DIASTOLIC BLOOD PRESSURE: 84 MMHG | BODY MASS INDEX: 28.62 KG/M2 | SYSTOLIC BLOOD PRESSURE: 128 MMHG | WEIGHT: 211 LBS | OXYGEN SATURATION: 98 % | TEMPERATURE: 97.1 F

## 2021-01-14 DIAGNOSIS — I10 HTN (HYPERTENSION), BENIGN: Primary | ICD-10-CM

## 2021-01-14 DIAGNOSIS — E78.2 MIXED HYPERLIPIDEMIA: ICD-10-CM

## 2021-01-14 PROBLEM — M71.21 BAKER'S CYST OF KNEE, RIGHT: Status: RESOLVED | Noted: 2018-05-03 | Resolved: 2021-01-14

## 2021-01-14 PROBLEM — K57.90 DIVERTICULOSIS OF INTESTINE WITHOUT BLEEDING: Status: RESOLVED | Noted: 2018-05-03 | Resolved: 2021-01-14

## 2021-01-14 PROCEDURE — 99213 OFFICE O/P EST LOW 20 MIN: CPT | Performed by: PHYSICIAN ASSISTANT

## 2021-01-14 RX ORDER — ATENOLOL 100 MG/1
100 TABLET ORAL DAILY
Qty: 90 TABLET | Refills: 1 | Status: SHIPPED | OUTPATIENT
Start: 2021-01-14 | End: 2021-07-14 | Stop reason: SDUPTHER

## 2021-01-14 RX ORDER — HYDROCHLOROTHIAZIDE 25 MG/1
25 TABLET ORAL DAILY
Qty: 90 TABLET | Refills: 1 | Status: SHIPPED | OUTPATIENT
Start: 2021-01-14 | End: 2021-07-12 | Stop reason: SDUPTHER

## 2021-01-14 ASSESSMENT — ENCOUNTER SYMPTOMS
CHEST TIGHTNESS: 0
SHORTNESS OF BREATH: 0

## 2021-01-14 ASSESSMENT — PATIENT HEALTH QUESTIONNAIRE - PHQ9
SUM OF ALL RESPONSES TO PHQ QUESTIONS 1-9: 0
SUM OF ALL RESPONSES TO PHQ QUESTIONS 1-9: 0
2. FEELING DOWN, DEPRESSED OR HOPELESS: 0
1. LITTLE INTEREST OR PLEASURE IN DOING THINGS: 0
SUM OF ALL RESPONSES TO PHQ9 QUESTIONS 1 & 2: 0
SUM OF ALL RESPONSES TO PHQ QUESTIONS 1-9: 0

## 2021-01-14 NOTE — PROGRESS NOTES
2021  Verblu Slim (: 1955)  63 y.o.      HPI  HTN: Is not checking his blood pressure at home. Denies: chest pain, shortness of breath, lower extremity edema or dizziness  Exercise- walks a lot at home now that he is retired  Diet- Vegetarian, rare meat intake, low processed foods, salt free diet  Weight-  Increased by a few lbs  Lab work- past due    HLD:  Pt has not been taking atorvastatin but thought that he was. His medication has not been filled since 2019. Eats fairly low fat diet. Pt is interested in the COVID vaccine when it becomes available to him    Review of Systems   Constitutional: Negative for chills, fatigue and unexpected weight change. Eyes: Negative for visual disturbance. Respiratory: Negative for chest tightness and shortness of breath. Cardiovascular: Negative for chest pain, palpitations and leg swelling. Neurological: Negative for dizziness, light-headedness and headaches. Hematological: Does not bruise/bleed easily. Allergies, past medical history, family history, and social history reviewed and unchanged from previous encounter. Current Outpatient Medications   Medication Sig Dispense Refill    hydroCHLOROthiazide (HYDRODIURIL) 25 MG tablet Take 1 tablet by mouth daily 90 tablet 1    atenolol (TENORMIN) 100 MG tablet Take 1 tablet by mouth daily 90 tablet 1    ciclopirox (LOPROX) 0.77 % cream Apply topically 2 times daily Apply topically 2 times daily. 45 g 2    atorvastatin (LIPITOR) 40 MG tablet Take 1 tablet by mouth daily 90 tablet 1     No current facility-administered medications for this visit. Vitals:    21 0751   BP: 128/84   Pulse: 67   Temp: 97.1 °F (36.2 °C)   TempSrc: Temporal   SpO2: 98%   Weight: 211 lb (95.7 kg)     Estimated body mass index is 28.62 kg/m² as calculated from the following:    Height as of 20: 6' (1.829 m). Weight as of this encounter: 211 lb (95.7 kg).     Physical Exam  Vitals signs

## 2021-01-18 DIAGNOSIS — R71.8 ELEVATED HEMATOCRIT: ICD-10-CM

## 2021-01-18 DIAGNOSIS — I10 HTN (HYPERTENSION), BENIGN: ICD-10-CM

## 2021-01-18 DIAGNOSIS — E78.2 MIXED HYPERLIPIDEMIA: ICD-10-CM

## 2021-01-18 DIAGNOSIS — D58.2 ELEVATED HEMOGLOBIN (HCC): Primary | ICD-10-CM

## 2021-01-18 LAB
A/G RATIO: 2.3 (ref 1.1–2.2)
ALBUMIN SERPL-MCNC: 4.8 G/DL (ref 3.4–5)
ALP BLD-CCNC: 86 U/L (ref 40–129)
ALT SERPL-CCNC: 26 U/L (ref 10–40)
ANION GAP SERPL CALCULATED.3IONS-SCNC: 11 MMOL/L (ref 3–16)
AST SERPL-CCNC: 17 U/L (ref 15–37)
BILIRUB SERPL-MCNC: 0.7 MG/DL (ref 0–1)
BUN BLDV-MCNC: 14 MG/DL (ref 7–20)
CALCIUM SERPL-MCNC: 9.9 MG/DL (ref 8.3–10.6)
CHLORIDE BLD-SCNC: 100 MMOL/L (ref 99–110)
CHOLESTEROL, TOTAL: 181 MG/DL (ref 0–199)
CO2: 28 MMOL/L (ref 21–32)
CREAT SERPL-MCNC: 1.2 MG/DL (ref 0.8–1.3)
GFR AFRICAN AMERICAN: >60
GFR NON-AFRICAN AMERICAN: >60
GLOBULIN: 2.1 G/DL
GLUCOSE BLD-MCNC: 94 MG/DL (ref 70–99)
HCT VFR BLD CALC: 53.9 % (ref 40.5–52.5)
HDLC SERPL-MCNC: 42 MG/DL (ref 40–60)
HEMOGLOBIN: 18.3 G/DL (ref 13.5–17.5)
LDL CHOLESTEROL CALCULATED: 107 MG/DL
MCH RBC QN AUTO: 31 PG (ref 26–34)
MCHC RBC AUTO-ENTMCNC: 33.9 G/DL (ref 31–36)
MCV RBC AUTO: 91.5 FL (ref 80–100)
PDW BLD-RTO: 14 % (ref 12.4–15.4)
PLATELET # BLD: 198 K/UL (ref 135–450)
PMV BLD AUTO: 9.2 FL (ref 5–10.5)
POTASSIUM SERPL-SCNC: 4.6 MMOL/L (ref 3.5–5.1)
RBC # BLD: 5.89 M/UL (ref 4.2–5.9)
SODIUM BLD-SCNC: 139 MMOL/L (ref 136–145)
TOTAL PROTEIN: 6.9 G/DL (ref 6.4–8.2)
TRIGL SERPL-MCNC: 158 MG/DL (ref 0–150)
VLDLC SERPL CALC-MCNC: 32 MG/DL
WBC # BLD: 7 K/UL (ref 4–11)

## 2021-01-19 DIAGNOSIS — R71.8 ELEVATED HEMATOCRIT: ICD-10-CM

## 2021-01-19 DIAGNOSIS — D58.2 ELEVATED HEMOGLOBIN (HCC): ICD-10-CM

## 2021-01-21 DIAGNOSIS — D58.2 ELEVATED HEMOGLOBIN (HCC): Primary | ICD-10-CM

## 2021-01-21 DIAGNOSIS — R71.8 ELEVATED HEMATOCRIT: ICD-10-CM

## 2021-01-21 LAB — ERYTHROPOIETIN: 8 MU/ML (ref 4–27)

## 2021-01-21 RX ORDER — ATORVASTATIN CALCIUM 40 MG/1
40 TABLET, FILM COATED ORAL DAILY
Qty: 90 TABLET | Refills: 1 | Status: SHIPPED | OUTPATIENT
Start: 2021-01-21 | End: 2022-08-31

## 2021-07-12 DIAGNOSIS — I10 HTN (HYPERTENSION), BENIGN: ICD-10-CM

## 2021-07-12 RX ORDER — HYDROCHLOROTHIAZIDE 25 MG/1
25 TABLET ORAL DAILY
Qty: 90 TABLET | Refills: 1 | Status: SHIPPED | OUTPATIENT
Start: 2021-07-12 | End: 2021-10-26

## 2021-07-14 DIAGNOSIS — I10 HTN (HYPERTENSION), BENIGN: ICD-10-CM

## 2021-07-14 RX ORDER — ATENOLOL 100 MG/1
100 TABLET ORAL DAILY
Qty: 90 TABLET | Refills: 1 | Status: SHIPPED | OUTPATIENT
Start: 2021-07-14 | End: 2022-01-31 | Stop reason: SDUPTHER

## 2021-07-14 NOTE — TELEPHONE ENCOUNTER
Last office visit 1/14/2021     Last written 1- x 1 refill    Next office visit scheduled 9/1/2021    Requested Prescriptions     Pending Prescriptions Disp Refills    atenolol (TENORMIN) 100 MG tablet 90 tablet 1     Sig: Take 1 tablet by mouth daily

## 2021-10-26 DIAGNOSIS — I10 HTN (HYPERTENSION), BENIGN: ICD-10-CM

## 2021-10-26 RX ORDER — HYDROCHLOROTHIAZIDE 25 MG/1
TABLET ORAL
Qty: 90 TABLET | Refills: 0 | Status: SHIPPED | OUTPATIENT
Start: 2021-10-26 | End: 2022-08-08 | Stop reason: DRUGHIGH

## 2021-10-26 NOTE — TELEPHONE ENCOUNTER
Refill Request     Last Seen: Last Seen Department: 1/14/2021  Last Seen by PCP: 1/14/2021    Last Written: 7/12/21 90 tablet 1 refill     Next Appointment:       Message to scenios to schedule appointment.          Requested Prescriptions     Pending Prescriptions Disp Refills    hydroCHLOROthiazide (HYDRODIURIL) 25 MG tablet [Pharmacy Med Name: hydroCHLOROthiazide Oral Tablet 25 MG] 90 tablet 0     Sig: TAKE 1 TABLET BY MOUTH EVERY DAY

## 2022-01-31 DIAGNOSIS — I10 HTN (HYPERTENSION), BENIGN: ICD-10-CM

## 2022-02-01 RX ORDER — ATENOLOL 100 MG/1
100 TABLET ORAL DAILY
Qty: 90 TABLET | Refills: 1 | Status: SHIPPED | OUTPATIENT
Start: 2022-02-01 | End: 2022-08-31 | Stop reason: SDUPTHER

## 2022-02-01 NOTE — TELEPHONE ENCOUNTER
.  Refill Request     Last Seen: Last Seen Department: 1/14/2021  Last Seen by PCP: 1/14/2021    Last Written: 7/14/21 90 with 1     Next Appointment:   No future appointments.       Requested Prescriptions     Pending Prescriptions Disp Refills    atenolol (TENORMIN) 100 MG tablet 90 tablet 1     Sig: Take 1 tablet by mouth daily

## 2022-08-07 DIAGNOSIS — I10 HTN (HYPERTENSION), BENIGN: ICD-10-CM

## 2022-08-08 RX ORDER — HYDROCHLOROTHIAZIDE 25 MG/1
25 TABLET ORAL EVERY MORNING
Qty: 30 TABLET | Refills: 0 | Status: SHIPPED | OUTPATIENT
Start: 2022-08-08 | End: 2022-08-31 | Stop reason: SDUPTHER

## 2022-08-08 RX ORDER — HYDROCHLOROTHIAZIDE 25 MG/1
TABLET ORAL
Qty: 90 TABLET | Refills: 1 | OUTPATIENT
Start: 2022-08-08

## 2022-08-08 NOTE — TELEPHONE ENCOUNTER
Patient went ahead and scheduled a physical for 8/31/22 with Kaleb Brumfield. He is asking if he can go ahead and get the hydrochlorothiazide refilled, as his current Rx will not last that long.

## 2022-08-08 NOTE — TELEPHONE ENCOUNTER
.Refill Request     CONFIRM preferrred pharmacy with the patient. If Mail Order Rx - Pend for 90 day refill. Last Seen: Last Seen Department: 1/14/2021  Last Seen by PCP: 1/14/2021    Last Written: 10-26-21 90 with 0     Next Appointment:   No future appointments.       Requested Prescriptions     Pending Prescriptions Disp Refills    hydroCHLOROthiazide (HYDRODIURIL) 25 MG tablet 90 tablet 1     Sig: Take one tab by mouth daily

## 2022-08-30 NOTE — PROGRESS NOTES
Nikhil Jiang (:  1955) is a 74 y.o. male,Established patient, here for evaluation of the following chief complaint(s):  Hypertension (Follow up, fasting blood work )         ASSESSMENT/PLAN:  1. HTN (hypertension), benign  -     Comprehensive Metabolic Panel; Future  -     CBC with Auto Differential; Future  -     atenolol (TENORMIN) 100 MG tablet; Take 1 tablet by mouth daily, Disp-90 tablet, R-1Normal  -     hydroCHLOROthiazide (HYDRODIURIL) 25 MG tablet; Take 1 tablet by mouth every morning, Disp-90 tablet, R-1Normal  2. Mixed hyperlipidemia  -     LIPID PANEL; Future  -     Patient has chosen to stop his medication over the last year. He will review his decision after his lab work is completed  3. Chest pain, unspecified type  -     EKG 12 lead: bradycardia  -     Stress test ordered. Pt will complete  4. Screening for AAA (abdominal aortic aneurysm)  -     US SCREENING FOR AAA; Future  5. Encounter for tobacco use cessation counseling  -     Nicotine and Metabolites; Future  -     Pt needs this for his insurance       Return in about 6 months (around 2023) for HTN and AWV.          Subjective   SUBJECTIVE/OBJECTIVE:  HPI  HTN:  Current medication: tenormin and hydrochlorothiazide  Side effects: none  Home blood pressure readings: always normal per the pt  Reports: chest pain, started 6 months, intermittent with rest or activity  Denies: heart palpitations, lower extremity swelling, dizziness, shortness of breath    HLD:  Was previously on lipitor but has not had this medication in a year, no real reason for this  Diet: avoids sodium and meat, eating a sweet treat every evening  Exercise: works a physical job  Weight: stable  The 10-year ASCVD risk score (Bharat Jiang., et al., 2013) is: 12.5%    Values used to calculate the score:      Age: 74 years      Sex: Male      Is Non- : No      Diabetic: No      Tobacco smoker: No      Systolic Blood Pressure: 655 mmHg      Is BP treated: Yes      HDL Cholesterol: 42 mg/dL      Total Cholesterol: 181 mg/dL      Stopped smoking on Thanksgiving Morning, read a book called \"Quit Smoking\" by Milton Madrid  Pt has been noncompliant with follow up, have not seen him in over a year  He declines any vaccinations due to a family hx of heroin abuse  Due for AAA screening    Review of Systems   Constitutional:  Negative for diaphoresis, fatigue and unexpected weight change. Eyes:  Negative for visual disturbance. Respiratory:  Negative for chest tightness, shortness of breath and wheezing. Cardiovascular:  Positive for chest pain. Negative for palpitations and leg swelling. Gastrointestinal:  Negative for blood in stool. Endocrine: Negative for polydipsia, polyphagia and polyuria. Genitourinary:  Negative for hematuria. Skin:  Negative for color change and pallor. Neurological:  Negative for dizziness, light-headedness and headaches. Objective   Physical Exam  Vitals reviewed. Constitutional:       Appearance: Normal appearance. He is normal weight. HENT:      Head: Normocephalic and atraumatic. Neck:      Thyroid: No thyromegaly. Vascular: No carotid bruit. Cardiovascular:      Rate and Rhythm: Normal rate and regular rhythm. Heart sounds: Normal heart sounds. Pulmonary:      Effort: Pulmonary effort is normal.      Breath sounds: Normal breath sounds. Musculoskeletal:      Right lower leg: No edema. Left lower leg: No edema. Neurological:      Mental Status: He is alert and oriented to person, place, and time. Cranial Nerves: No cranial nerve deficit. An electronic signature was used to authenticate this note.     --JEAN-PAUL Delgadillo

## 2022-08-31 ENCOUNTER — OFFICE VISIT (OUTPATIENT)
Dept: FAMILY MEDICINE CLINIC | Age: 67
End: 2022-08-31
Payer: MEDICARE

## 2022-08-31 VITALS
DIASTOLIC BLOOD PRESSURE: 78 MMHG | BODY MASS INDEX: 28.44 KG/M2 | OXYGEN SATURATION: 98 % | WEIGHT: 210 LBS | SYSTOLIC BLOOD PRESSURE: 114 MMHG | HEART RATE: 70 BPM | HEIGHT: 72 IN

## 2022-08-31 DIAGNOSIS — E78.2 MIXED HYPERLIPIDEMIA: ICD-10-CM

## 2022-08-31 DIAGNOSIS — I10 HTN (HYPERTENSION), BENIGN: ICD-10-CM

## 2022-08-31 DIAGNOSIS — Z13.6 SCREENING FOR AAA (ABDOMINAL AORTIC ANEURYSM): ICD-10-CM

## 2022-08-31 DIAGNOSIS — I10 HTN (HYPERTENSION), BENIGN: Primary | ICD-10-CM

## 2022-08-31 DIAGNOSIS — Z71.6 ENCOUNTER FOR TOBACCO USE CESSATION COUNSELING: ICD-10-CM

## 2022-08-31 DIAGNOSIS — R07.9 CHEST PAIN, UNSPECIFIED TYPE: ICD-10-CM

## 2022-08-31 LAB
A/G RATIO: 2 (ref 1.1–2.2)
ALBUMIN SERPL-MCNC: 4.5 G/DL (ref 3.4–5)
ALP BLD-CCNC: 84 U/L (ref 40–129)
ALT SERPL-CCNC: 24 U/L (ref 10–40)
ANION GAP SERPL CALCULATED.3IONS-SCNC: 15 MMOL/L (ref 3–16)
AST SERPL-CCNC: 16 U/L (ref 15–37)
BASOPHILS ABSOLUTE: 0.1 K/UL (ref 0–0.2)
BASOPHILS RELATIVE PERCENT: 1.3 %
BILIRUB SERPL-MCNC: 0.7 MG/DL (ref 0–1)
BUN BLDV-MCNC: 13 MG/DL (ref 7–20)
CALCIUM SERPL-MCNC: 9.4 MG/DL (ref 8.3–10.6)
CHLORIDE BLD-SCNC: 104 MMOL/L (ref 99–110)
CHOLESTEROL, TOTAL: 233 MG/DL (ref 0–199)
CO2: 24 MMOL/L (ref 21–32)
CREAT SERPL-MCNC: 1.3 MG/DL (ref 0.8–1.3)
EOSINOPHILS ABSOLUTE: 0.3 K/UL (ref 0–0.6)
EOSINOPHILS RELATIVE PERCENT: 5.2 %
GFR AFRICAN AMERICAN: >60
GFR NON-AFRICAN AMERICAN: 55
GLUCOSE BLD-MCNC: 118 MG/DL (ref 70–99)
HCT VFR BLD CALC: 50.6 % (ref 40.5–52.5)
HDLC SERPL-MCNC: 45 MG/DL (ref 40–60)
HEMOGLOBIN: 17.5 G/DL (ref 13.5–17.5)
LDL CHOLESTEROL CALCULATED: 162 MG/DL
LYMPHOCYTES ABSOLUTE: 2.2 K/UL (ref 1–5.1)
LYMPHOCYTES RELATIVE PERCENT: 34.1 %
MCH RBC QN AUTO: 31.4 PG (ref 26–34)
MCHC RBC AUTO-ENTMCNC: 34.5 G/DL (ref 31–36)
MCV RBC AUTO: 91 FL (ref 80–100)
MONOCYTES ABSOLUTE: 0.6 K/UL (ref 0–1.3)
MONOCYTES RELATIVE PERCENT: 10.1 %
NEUTROPHILS ABSOLUTE: 3.1 K/UL (ref 1.7–7.7)
NEUTROPHILS RELATIVE PERCENT: 49.3 %
PDW BLD-RTO: 14.2 % (ref 12.4–15.4)
PLATELET # BLD: 184 K/UL (ref 135–450)
PMV BLD AUTO: 8.5 FL (ref 5–10.5)
POTASSIUM SERPL-SCNC: 3.9 MMOL/L (ref 3.5–5.1)
RBC # BLD: 5.56 M/UL (ref 4.2–5.9)
SODIUM BLD-SCNC: 143 MMOL/L (ref 136–145)
TOTAL PROTEIN: 6.7 G/DL (ref 6.4–8.2)
TRIGL SERPL-MCNC: 132 MG/DL (ref 0–150)
VLDLC SERPL CALC-MCNC: 26 MG/DL
WBC # BLD: 6.3 K/UL (ref 4–11)

## 2022-08-31 PROCEDURE — G8417 CALC BMI ABV UP PARAM F/U: HCPCS | Performed by: PHYSICIAN ASSISTANT

## 2022-08-31 PROCEDURE — 93000 ELECTROCARDIOGRAM COMPLETE: CPT | Performed by: PHYSICIAN ASSISTANT

## 2022-08-31 PROCEDURE — 1036F TOBACCO NON-USER: CPT | Performed by: PHYSICIAN ASSISTANT

## 2022-08-31 PROCEDURE — 3017F COLORECTAL CA SCREEN DOC REV: CPT | Performed by: PHYSICIAN ASSISTANT

## 2022-08-31 PROCEDURE — 99214 OFFICE O/P EST MOD 30 MIN: CPT | Performed by: PHYSICIAN ASSISTANT

## 2022-08-31 PROCEDURE — 1123F ACP DISCUSS/DSCN MKR DOCD: CPT | Performed by: PHYSICIAN ASSISTANT

## 2022-08-31 PROCEDURE — G8427 DOCREV CUR MEDS BY ELIG CLIN: HCPCS | Performed by: PHYSICIAN ASSISTANT

## 2022-08-31 RX ORDER — HYDROCHLOROTHIAZIDE 25 MG/1
25 TABLET ORAL EVERY MORNING
Qty: 90 TABLET | Refills: 1 | Status: SHIPPED | OUTPATIENT
Start: 2022-08-31 | End: 2022-10-12

## 2022-08-31 RX ORDER — ATENOLOL 100 MG/1
100 TABLET ORAL DAILY
Qty: 90 TABLET | Refills: 1 | Status: SHIPPED | OUTPATIENT
Start: 2022-08-31

## 2022-08-31 SDOH — ECONOMIC STABILITY: INCOME INSECURITY: IN THE LAST 12 MONTHS, WAS THERE A TIME WHEN YOU WERE NOT ABLE TO PAY THE MORTGAGE OR RENT ON TIME?: NO

## 2022-08-31 SDOH — ECONOMIC STABILITY: TRANSPORTATION INSECURITY
IN THE PAST 12 MONTHS, HAS LACK OF TRANSPORTATION KEPT YOU FROM MEETINGS, WORK, OR FROM GETTING THINGS NEEDED FOR DAILY LIVING?: NO

## 2022-08-31 SDOH — ECONOMIC STABILITY: HOUSING INSECURITY: IN THE LAST 12 MONTHS, HOW MANY PLACES HAVE YOU LIVED?: 0

## 2022-08-31 SDOH — ECONOMIC STABILITY: HOUSING INSECURITY
IN THE LAST 12 MONTHS, WAS THERE A TIME WHEN YOU DID NOT HAVE A STEADY PLACE TO SLEEP OR SLEPT IN A SHELTER (INCLUDING NOW)?: NO

## 2022-08-31 SDOH — ECONOMIC STABILITY: FOOD INSECURITY: WITHIN THE PAST 12 MONTHS, THE FOOD YOU BOUGHT JUST DIDN'T LAST AND YOU DIDN'T HAVE MONEY TO GET MORE.: NEVER TRUE

## 2022-08-31 SDOH — ECONOMIC STABILITY: TRANSPORTATION INSECURITY
IN THE PAST 12 MONTHS, HAS THE LACK OF TRANSPORTATION KEPT YOU FROM MEDICAL APPOINTMENTS OR FROM GETTING MEDICATIONS?: NO

## 2022-08-31 SDOH — ECONOMIC STABILITY: FOOD INSECURITY: WITHIN THE PAST 12 MONTHS, YOU WORRIED THAT YOUR FOOD WOULD RUN OUT BEFORE YOU GOT MONEY TO BUY MORE.: NEVER TRUE

## 2022-08-31 ASSESSMENT — PATIENT HEALTH QUESTIONNAIRE - PHQ9
4. FEELING TIRED OR HAVING LITTLE ENERGY: 0
SUM OF ALL RESPONSES TO PHQ QUESTIONS 1-9: 0
1. LITTLE INTEREST OR PLEASURE IN DOING THINGS: 0
8. MOVING OR SPEAKING SO SLOWLY THAT OTHER PEOPLE COULD HAVE NOTICED. OR THE OPPOSITE, BEING SO FIGETY OR RESTLESS THAT YOU HAVE BEEN MOVING AROUND A LOT MORE THAN USUAL: 0
2. FEELING DOWN, DEPRESSED OR HOPELESS: 0
6. FEELING BAD ABOUT YOURSELF - OR THAT YOU ARE A FAILURE OR HAVE LET YOURSELF OR YOUR FAMILY DOWN: 0
SUM OF ALL RESPONSES TO PHQ QUESTIONS 1-9: 0
7. TROUBLE CONCENTRATING ON THINGS, SUCH AS READING THE NEWSPAPER OR WATCHING TELEVISION: 0
9. THOUGHTS THAT YOU WOULD BE BETTER OFF DEAD, OR OF HURTING YOURSELF: 0
SUM OF ALL RESPONSES TO PHQ QUESTIONS 1-9: 0
10. IF YOU CHECKED OFF ANY PROBLEMS, HOW DIFFICULT HAVE THESE PROBLEMS MADE IT FOR YOU TO DO YOUR WORK, TAKE CARE OF THINGS AT HOME, OR GET ALONG WITH OTHER PEOPLE: 0
DEPRESSION UNABLE TO ASSESS: FUNCTIONAL CAPACITY MOTIVATION LIMITS ACCURACY
SUM OF ALL RESPONSES TO PHQ QUESTIONS 1-9: 0
SUM OF ALL RESPONSES TO PHQ9 QUESTIONS 1 & 2: 0
3. TROUBLE FALLING OR STAYING ASLEEP: 0
5. POOR APPETITE OR OVEREATING: 0

## 2022-08-31 ASSESSMENT — ENCOUNTER SYMPTOMS
WHEEZING: 0
COLOR CHANGE: 0
SHORTNESS OF BREATH: 0
BLOOD IN STOOL: 0
CHEST TIGHTNESS: 0

## 2022-08-31 ASSESSMENT — SOCIAL DETERMINANTS OF HEALTH (SDOH): HOW HARD IS IT FOR YOU TO PAY FOR THE VERY BASICS LIKE FOOD, HOUSING, MEDICAL CARE, AND HEATING?: NOT HARD AT ALL

## 2022-09-01 DIAGNOSIS — R73.01 IMPAIRED FASTING GLUCOSE: ICD-10-CM

## 2022-09-01 DIAGNOSIS — N28.9 ABNORMAL KIDNEY FUNCTION: Primary | ICD-10-CM

## 2022-09-01 LAB
ESTIMATED AVERAGE GLUCOSE: 108.3 MG/DL
HBA1C MFR BLD: 5.4 %

## 2022-09-03 LAB
COTININE: <5 NG/ML
NICOTINE: <5 NG/ML

## 2022-09-06 RX ORDER — LISINOPRIL 5 MG/1
5 TABLET ORAL DAILY
Qty: 30 TABLET | Refills: 5 | Status: SHIPPED | OUTPATIENT
Start: 2022-09-06 | End: 2022-09-26

## 2022-09-06 NOTE — PROGRESS NOTES
Pt aware. To clarify this is with the atenolol correct? I didn't see on the OV note where this was stopped. Please advise. I advised patient I would call him again once I confirmed this.

## 2022-09-06 NOTE — PROGRESS NOTES
Lisinopril 5 mg sent in to the pharmacy for the patient to take once daily. Call me in a week with blood pressure readings.  Thank you

## 2022-09-26 ENCOUNTER — TELEPHONE (OUTPATIENT)
Dept: FAMILY MEDICINE CLINIC | Age: 67
End: 2022-09-26

## 2022-09-26 RX ORDER — LISINOPRIL 20 MG/1
20 TABLET ORAL DAILY
Qty: 90 TABLET | Refills: 1 | Status: SHIPPED | OUTPATIENT
Start: 2022-09-26

## 2022-09-26 NOTE — TELEPHONE ENCOUNTER
----- Message from Brandie Danielle sent at 9/26/2022  9:17 AM EDT -----  Subject: Message to Provider    QUESTIONS  Information for Provider? PT called in to discuss BP meds. States they   have recently been changed and they are not working.   ---------------------------------------------------------------------------  --------------  [id number] [employer]  [phone]; OK to leave message on voicemail  ---------------------------------------------------------------------------  --------------  SCRIPT ANSWERS  Relationship to Patient?  Self

## 2022-09-26 NOTE — TELEPHONE ENCOUNTER
Let's increase lisinopril to 20 mg. Schedule him for a blood pressure follow up with me in 2 weeks, ok to be 15 minutes

## 2022-09-26 NOTE — TELEPHONE ENCOUNTER
Called patient to see what he needed. He said you switched his BP medication due to his Kidneys,   He said BP has been 140/94, 160/99, 151/99     He said you added Lisinopril 5mg but it wasn't helping so he started taking 2 daily to make 10 mg. He wants to know what to do? He said he wasn't home to let me know all his medications. HCTZ is still on his med list but you wanted him to stop it.

## 2022-10-12 ENCOUNTER — OFFICE VISIT (OUTPATIENT)
Dept: FAMILY MEDICINE CLINIC | Age: 67
End: 2022-10-12
Payer: MEDICARE

## 2022-10-12 VITALS
OXYGEN SATURATION: 97 % | HEART RATE: 53 BPM | WEIGHT: 209 LBS | SYSTOLIC BLOOD PRESSURE: 122 MMHG | BODY MASS INDEX: 28.35 KG/M2 | DIASTOLIC BLOOD PRESSURE: 88 MMHG

## 2022-10-12 DIAGNOSIS — J01.90 ACUTE BACTERIAL SINUSITIS: Primary | ICD-10-CM

## 2022-10-12 DIAGNOSIS — B96.89 ACUTE BACTERIAL SINUSITIS: Primary | ICD-10-CM

## 2022-10-12 DIAGNOSIS — I10 HTN (HYPERTENSION), BENIGN: ICD-10-CM

## 2022-10-12 PROCEDURE — G8484 FLU IMMUNIZE NO ADMIN: HCPCS | Performed by: PHYSICIAN ASSISTANT

## 2022-10-12 PROCEDURE — 1123F ACP DISCUSS/DSCN MKR DOCD: CPT | Performed by: PHYSICIAN ASSISTANT

## 2022-10-12 PROCEDURE — G8417 CALC BMI ABV UP PARAM F/U: HCPCS | Performed by: PHYSICIAN ASSISTANT

## 2022-10-12 PROCEDURE — 99214 OFFICE O/P EST MOD 30 MIN: CPT | Performed by: PHYSICIAN ASSISTANT

## 2022-10-12 PROCEDURE — 1036F TOBACCO NON-USER: CPT | Performed by: PHYSICIAN ASSISTANT

## 2022-10-12 PROCEDURE — G8427 DOCREV CUR MEDS BY ELIG CLIN: HCPCS | Performed by: PHYSICIAN ASSISTANT

## 2022-10-12 PROCEDURE — 3017F COLORECTAL CA SCREEN DOC REV: CPT | Performed by: PHYSICIAN ASSISTANT

## 2022-10-12 RX ORDER — AMOXICILLIN AND CLAVULANATE POTASSIUM 875; 125 MG/1; MG/1
1 TABLET, FILM COATED ORAL 2 TIMES DAILY
Qty: 20 TABLET | Refills: 0 | Status: SHIPPED | OUTPATIENT
Start: 2022-10-12 | End: 2022-10-22

## 2022-10-12 ASSESSMENT — ENCOUNTER SYMPTOMS
SHORTNESS OF BREATH: 0
WHEEZING: 0
SINUS PRESSURE: 1
SORE THROAT: 0
RHINORRHEA: 1
SINUS PAIN: 0
COUGH: 1

## 2022-10-12 NOTE — PROGRESS NOTES
Desiree Olmos (:  1955) is a 74 y.o. male,Established patient, here for evaluation of the following chief complaint(s):  Hypertension         ASSESSMENT/PLAN:  1. Acute bacterial sinusitis  -     amoxicillin-clavulanate (AUGMENTIN) 875-125 MG per tablet; Take 1 tablet by mouth 2 times daily for 10 days, Disp-20 tablet, R-0Normal  -     consider Coricidin to improve symptoms. Follow up if symptoms do not improve  2. HTN (hypertension), benign       -   uncontrolled, pt will return on Friday to complete his renal function panel. He would like to restart the HCTZ if labs are improved since this seemed to control his diastolic blood pressure better. Continue with current dose of lisinopril and atenolol. Adjusts to be determined after lab work returns    Return in about 6 months (around 2023) for HTN. Subjective   SUBJECTIVE/OBJECTIVE:  HPI  HTN:  Recently stopped hctz due to a change in renal function  The pt believes that change may have been related to him drinking alcohol and not drinking water. He has stopped alcohol at this time  Blood pressure readings at home have been variable on lisinopril and atenolol  Reports: none  Denies: chest pain, shortness of breath, headache    Sinusitis:  Please see ROS  Symptoms started two weeks ago and are unchanged  Sick contacts: none  Tx: none    Review of Systems   Constitutional:  Negative for fever. HENT:  Positive for congestion, postnasal drip, rhinorrhea and sinus pressure. Negative for ear pain, sinus pain and sore throat. Respiratory:  Positive for cough. Negative for shortness of breath and wheezing. Skin:  Negative for pallor. Neurological:  Negative for dizziness and headaches. Objective   Physical Exam  Vitals reviewed. Constitutional:       Appearance: Normal appearance. HENT:      Head: Normocephalic and atraumatic. Right Ear: Hearing and tympanic membrane normal. There is impacted cerumen.       Left Ear: Hearing, tympanic membrane and ear canal normal.      Nose: Mucosal edema and congestion present. Mouth/Throat:      Lips: Pink. Mouth: Mucous membranes are moist.      Pharynx: Oropharynx is clear. Uvula midline. Cardiovascular:      Rate and Rhythm: Normal rate and regular rhythm. Heart sounds: Normal heart sounds. Pulmonary:      Effort: Pulmonary effort is normal.      Breath sounds: Normal breath sounds. Neurological:      Mental Status: He is alert. An electronic signature was used to authenticate this note.     --JEAN-PAUL Swift

## 2022-10-14 DIAGNOSIS — N28.9 ABNORMAL KIDNEY FUNCTION: ICD-10-CM

## 2022-10-14 LAB
ALBUMIN SERPL-MCNC: 4.9 G/DL (ref 3.4–5)
ANION GAP SERPL CALCULATED.3IONS-SCNC: 15 MMOL/L (ref 3–16)
BUN BLDV-MCNC: 12 MG/DL (ref 7–20)
CALCIUM SERPL-MCNC: 9.4 MG/DL (ref 8.3–10.6)
CHLORIDE BLD-SCNC: 101 MMOL/L (ref 99–110)
CO2: 23 MMOL/L (ref 21–32)
CREAT SERPL-MCNC: 1.1 MG/DL (ref 0.8–1.3)
GFR AFRICAN AMERICAN: >60
GFR NON-AFRICAN AMERICAN: >60
GLUCOSE BLD-MCNC: 111 MG/DL (ref 70–99)
PHOSPHORUS: 2.8 MG/DL (ref 2.5–4.9)
POTASSIUM SERPL-SCNC: 4.4 MMOL/L (ref 3.5–5.1)
SODIUM BLD-SCNC: 139 MMOL/L (ref 136–145)

## 2022-10-17 DIAGNOSIS — I10 HTN (HYPERTENSION), BENIGN: ICD-10-CM

## 2022-10-17 DIAGNOSIS — G25.0 ESSENTIAL TREMOR: Primary | ICD-10-CM

## 2022-10-17 RX ORDER — HYDROCHLOROTHIAZIDE 25 MG/1
25 TABLET ORAL EVERY MORNING
Qty: 30 TABLET | Refills: 5 | Status: SHIPPED | OUTPATIENT
Start: 2022-10-17

## 2022-10-27 ENCOUNTER — TELEPHONE (OUTPATIENT)
Dept: FAMILY MEDICINE CLINIC | Age: 67
End: 2022-10-27

## 2022-10-27 ENCOUNTER — HOSPITAL ENCOUNTER (OUTPATIENT)
Dept: NON INVASIVE DIAGNOSTICS | Age: 67
Discharge: HOME | End: 2022-10-27

## 2022-10-27 NOTE — TELEPHONE ENCOUNTER
----- Message from Paulina Boswelln sent at 10/27/2022 10:44 AM EDT -----  Subject: Message to Provider    QUESTIONS  Information for Provider? patient says he was not able to get his stress   test done today (10/27) because no one told him he was not supposed to   take his atenolol for 2 nights. He was told the test would not work. Please let Lily Hernandez know.  ---------------------------------------------------------------------------  --------------  Tristin MORRISON  [phone]; OK to leave message on voicemail  ---------------------------------------------------------------------------  --------------  SCRIPT ANSWERS  Relationship to Patient?  Self

## 2022-10-27 NOTE — TELEPHONE ENCOUNTER
That's okay, please reschedule and let him know I'm sorry, the  should have relayed that information

## 2022-12-12 ENCOUNTER — TELEPHONE (OUTPATIENT)
Dept: FAMILY MEDICINE CLINIC | Age: 67
End: 2022-12-12

## 2022-12-12 NOTE — TELEPHONE ENCOUNTER
----- Message from [address] sent at 12/12/2022  8:45 AM EST -----  Subject: Message to Provider    QUESTIONS  Information for Provider? [place] Bella would like labs drawn for his kidneys due   to medication change. Please put in order and call Ricardo Blanco to set up. Thank   you   ---------------------------------------------------------------------------  --------------  Shelia Olivares INFO  [phone]; OK to leave message on voicemail  ---------------------------------------------------------------------------  --------------  SCRIPT ANSWERS  Relationship to Patient?  Self

## 2022-12-19 DIAGNOSIS — I10 HTN (HYPERTENSION), BENIGN: ICD-10-CM

## 2022-12-19 DIAGNOSIS — G25.0 ESSENTIAL TREMOR: Primary | ICD-10-CM

## 2023-01-25 DIAGNOSIS — I10 HTN (HYPERTENSION), BENIGN: ICD-10-CM

## 2023-01-25 RX ORDER — ATENOLOL 100 MG/1
TABLET ORAL
Qty: 90 TABLET | Refills: 1 | Status: SHIPPED | OUTPATIENT
Start: 2023-01-25

## 2023-01-25 NOTE — TELEPHONE ENCOUNTER
Refill Request     CONFIRM preferrred pharmacy with the patient. If Mail Order Rx - Pend for 90 day refill. Last Seen: Last Seen Department: 10/12/2022  Last Seen by PCP: 10/12/2022    Last Written: 8/31/2022    If no future appointment scheduled, route STAFF MESSAGE with patient name to the [facility] for scheduling. Next Appointment:   Future Appointments   Date Time Provider Ralph Grant   2/28/2023  8:00 AM JEAN-PAUL Blancas [place] - [facility]       Message sent to [employer] to schedule appt with patient?   NO      Requested Prescriptions     Pending Prescriptions Disp Refills    atenolol (TENORMIN) 100 MG tablet [Pharmacy Med Name: Atenolol Oral Tablet 100 MG] 90 tablet 1     Sig: TAKE 1 TABLET BY MOUTH EVERY DAY

## 2023-03-02 SDOH — HEALTH STABILITY: PHYSICAL HEALTH: ON AVERAGE, HOW MANY DAYS PER WEEK DO YOU ENGAGE IN MODERATE TO STRENUOUS EXERCISE (LIKE A BRISK WALK)?: 7 DAYS

## 2023-03-02 SDOH — HEALTH STABILITY: PHYSICAL HEALTH: ON AVERAGE, HOW MANY MINUTES DO YOU ENGAGE IN EXERCISE AT THIS LEVEL?: 50 MIN

## 2023-03-02 ASSESSMENT — LIFESTYLE VARIABLES
HOW MANY STANDARD DRINKS CONTAINING ALCOHOL DO YOU HAVE ON A TYPICAL DAY: 0
HOW OFTEN DO YOU HAVE SIX OR MORE DRINKS ON ONE OCCASION: 1
HOW MANY STANDARD DRINKS CONTAINING ALCOHOL DO YOU HAVE ON A TYPICAL DAY: PATIENT DOES NOT DRINK
HOW OFTEN DO YOU HAVE A DRINK CONTAINING ALCOHOL: NEVER
HOW OFTEN DO YOU HAVE A DRINK CONTAINING ALCOHOL: 1

## 2023-03-02 ASSESSMENT — PATIENT HEALTH QUESTIONNAIRE - PHQ9
SUM OF ALL RESPONSES TO PHQ QUESTIONS 1-9: 0
1. LITTLE INTEREST OR PLEASURE IN DOING THINGS: 0
SUM OF ALL RESPONSES TO PHQ QUESTIONS 1-9: 0
SUM OF ALL RESPONSES TO PHQ QUESTIONS 1-9: 0
2. FEELING DOWN, DEPRESSED OR HOPELESS: 0
SUM OF ALL RESPONSES TO PHQ QUESTIONS 1-9: 0
SUM OF ALL RESPONSES TO PHQ9 QUESTIONS 1 & 2: 0

## 2023-03-03 ENCOUNTER — OFFICE VISIT (OUTPATIENT)
Dept: FAMILY MEDICINE CLINIC | Age: 68
End: 2023-03-03

## 2023-03-03 VITALS
SYSTOLIC BLOOD PRESSURE: 124 MMHG | HEART RATE: 82 BPM | DIASTOLIC BLOOD PRESSURE: 78 MMHG | HEIGHT: 72 IN | WEIGHT: 209 LBS | OXYGEN SATURATION: 98 % | BODY MASS INDEX: 28.31 KG/M2

## 2023-03-03 DIAGNOSIS — I10 HTN (HYPERTENSION), BENIGN: ICD-10-CM

## 2023-03-03 DIAGNOSIS — Z00.00 WELCOME TO MEDICARE PREVENTIVE VISIT: Primary | ICD-10-CM

## 2023-03-03 DIAGNOSIS — R07.9 CHEST PAIN, UNSPECIFIED TYPE: ICD-10-CM

## 2023-03-03 DIAGNOSIS — E78.2 MIXED HYPERLIPIDEMIA: ICD-10-CM

## 2023-03-03 DIAGNOSIS — J30.1 SEASONAL ALLERGIC RHINITIS DUE TO POLLEN: ICD-10-CM

## 2023-03-03 PROBLEM — Z72.0 TOBACCO USE: Status: RESOLVED | Noted: 2018-05-03 | Resolved: 2023-03-03

## 2023-03-03 RX ORDER — CETIRIZINE HYDROCHLORIDE 10 MG/1
10 TABLET ORAL DAILY
Qty: 90 TABLET | Refills: 1 | Status: SHIPPED | OUTPATIENT
Start: 2023-03-03

## 2023-03-03 RX ORDER — HYDROCHLOROTHIAZIDE 25 MG/1
25 TABLET ORAL EVERY MORNING
Qty: 90 TABLET | Refills: 1 | Status: SHIPPED | OUTPATIENT
Start: 2023-03-03

## 2023-03-03 ASSESSMENT — ENCOUNTER SYMPTOMS
RHINORRHEA: 1
SINUS PRESSURE: 0
SHORTNESS OF BREATH: 0
SINUS PAIN: 0
WHEEZING: 0
CHEST TIGHTNESS: 0

## 2023-03-03 NOTE — ACP (ADVANCE CARE PLANNING)
Advance Care Planning     General Advance Care Planning (ACP) Conversation    Date of Conversation: 3/3/2023  Conducted with: Patient with Decision Making Capacity    Healthcare Decision Maker:    Primary Decision Maker: Isha Haider - Spouse - [phone]  Click here to complete Healthcare Decision Makers including selection of the Healthcare Decision Maker Relationship (ie \"Primary\"). Today we documented Decision Maker(s) consistent with Legal Next of Kin hierarchy.     Content/Action Overview:  Has NO ACP documents/care preferences - information provided, considering goals and options      Length of Voluntary ACP Conversation in minutes:  <16 minutes (Non-Billable)    JEAN-PAUL Lang

## 2023-03-03 NOTE — PATIENT INSTRUCTIONS
Learning About Vision Tests  What are vision tests? The four most common vision tests are visual acuity tests, refraction, visual field tests, and color vision tests. Visual acuity (sharpness) tests  These tests are used: To see if you need glasses or contact lenses. To monitor an eye problem. To check an eye injury. Visual acuity tests are done as part of routine exams. You may also have this test when you get your 's license or apply for some types of jobs. Visual field tests  These tests are used: To check for vision loss in any area of your range of vision. To screen for certain eye diseases. To look for nerve damage after a stroke, head injury, or other problem that could reduce blood flow to the brain. Refraction and color tests  A refraction test is done to find the right prescription for glasses and contact lenses. A color vision test is done to check for color blindness. Color vision is often tested as part of a routine exam. You may also have this test when you apply for a job where recognizing different colors is important, such as , electronics, or the [employer]. How are vision tests done? Visual acuity test   You cover one eye at a time. You read aloud from a wall chart across the room. You read aloud from a small card that you hold in your hand. Refraction   You look into a special device. The device puts lenses of different strengths in front of each eye to see how strong your glasses or contact lenses need to be. Visual field tests   Your doctor may have you look through special machines. Or your doctor may simply have you stare straight ahead while they move a finger into and out of your field of vision. Color vision test   You look at pieces of printed test patterns in various colors. You say what number or symbol you see. Your doctor may have you trace the number or symbol using a pointer. How do these tests feel?   There is very little chance of having a problem from this test. If dilating drops are used for a vision test, they may make the eyes sting and cause a medicine taste in the mouth. Follow-up care is a key part of your treatment and safety. Be sure to make and go to all appointments, and call your doctor if you are having problems. It's also a good idea to know your test results and keep a list of the medicines you take. Where can you learn more? Go to http://www.webb.com/ and enter G551 to learn more about \"Learning About Vision Tests. \"  Current as of: October 12, 2022               Content Version: 13.5  © 8487-2837 [employer]. Care instructions adapted under license by [facility] ([facility]). If you have questions about a medical condition or this instruction, always ask your healthcare professional. [address] any warranty or liability for your use of this information. Advance Directives: Care Instructions  Overview  An advance directive is a legal way to state your wishes at the end of your life. It tells your family and your doctor what to do if you can't say what you want. There are two main types of advance directives. You can change them any time your wishes change. Living will. This form tells your family and your doctor your wishes about life support and other treatment. The form is also called a declaration. Medical power of . This form lets you name a person to make treatment decisions for you when you can't speak for yourself. This person is called a health care agent (health care proxy, health care surrogate). The form is also called a durable power of  for health care. If you do not have an advance directive, decisions about your medical care may be made by a family member, or by a doctor or a  who doesn't know you. It may help to think of an advance directive as a gift to the people who care for you.  If you have one, they won't have to make tough decisions by themselves. For more information, including forms for your state, see the [address] website (www.caringinfo.org/planning/advance-directives/). Follow-up care is a key part of your treatment and safety. Be sure to make and go to all appointments, and call your doctor if you are having problems. It's also a good idea to know your test results and keep a list of the medicines you take. What should you include in an advance directive? Many states have a unique advance directive form. (It may ask you to address specific issues.) Or you might use a universal form that's approved by many states. If your form doesn't tell you what to address, it may be hard to know what to include in your advance directive. Use the questions below to help you get started. Who do you want to make decisions about your medical care if you are not able to? What life-support measures do you want if you have a serious illness that gets worse over time or can't be cured? What are you most afraid of that might happen? (Maybe you're afraid of having pain, losing your independence, or being kept alive by machines.)  Where would you prefer to die? (Your home? A hospital? A nursing home?)  Do you want to donate your organs when you die? Do you want certain Buddhist practices performed before you die? When should you call for help? Be sure to contact your doctor if you have any questions. Where can you learn more? Go to http://www.webb.com/ and enter R264 to learn more about \"Advance Directives: Care Instructions. \"  Current as of: June 16, 2022               Content Version: 13.5  © 5285-5150 [employer]. Care instructions adapted under license by [facility] ([facility]). If you have questions about a medical condition or this instruction, always ask your healthcare professional. [address] any warranty or liability for your use of this information.            A Healthy Heart: Care Instructions  Your Care Instructions     Coronary artery disease, also called heart disease, occurs when a substance called plaque builds up in the vessels that supply oxygen-rich blood to your heart muscle. This can narrow the blood vessels and reduce blood flow. A heart attack happens when blood flow is completely blocked. A high-fat diet, smoking, and other factors increase the risk of heart disease. Your doctor has found that you have a chance of having heart disease. You can do lots of things to keep your heart healthy. It may not be easy, but you can change your diet, exercise more, and quit smoking. These steps really work to lower your chance of heart disease. Follow-up care is a key part of your treatment and safety. Be sure to make and go to all appointments, and call your doctor if you are having problems. It's also a good idea to know your test results and keep a list of the medicines you take. How can you care for yourself at home? Diet    Use less salt when you cook and eat. This helps lower your blood pressure. Taste food before salting. Add only a little salt when you think you need it. With time, your taste buds will adjust to less salt.     Eat fewer snack items, fast foods, canned soups, and other high-salt, high-fat, processed foods.     Read food labels and try to avoid saturated and trans fats. They increase your risk of heart disease by raising cholesterol levels.     Limit the amount of solid fat-butter, margarine, and shortening-you eat. Use olive, peanut, or canola oil when you cook. Bake, broil, and steam foods instead of frying them.     Eat a variety of fruit and vegetables every day. Dark green, deep orange, red, or yellow fruits and vegetables are especially good for you. Examples include spinach, carrots, peaches, and berries.     Foods high in fiber can reduce your cholesterol and provide important vitamins and minerals.  High-fiber foods include whole-grain cereals and breads, oatmeal, beans, brown rice, citrus fruits, and apples.     Eat lean proteins. Heart-healthy proteins include seafood, lean meats and poultry, eggs, beans, peas, nuts, seeds, and soy products.     Limit drinks and foods with added sugar. These include candy, desserts, and soda pop. Lifestyle changes    If your doctor recommends it, get more exercise. Walking is a good choice. Bit by bit, increase the amount you walk every day. Try for at least 30 minutes on most days of the week. You also may want to swim, bike, or do other activities.     Do not smoke. If you need help quitting, talk to your doctor about stop-smoking programs and medicines. These can increase your chances of quitting for good. Quitting smoking may be the most important step you can take to protect your heart. It is never too late to quit.     Limit alcohol to 2 drinks a day for men and 1 drink a day for women. Too much alcohol can cause health problems.     Manage other health problems such as diabetes, high blood pressure, and high cholesterol. If you think you may have a problem with alcohol or drug use, talk to your doctor. Medicines    Take your medicines exactly as prescribed. Call your doctor if you think you are having a problem with your medicine.     If your doctor recommends aspirin, take the amount directed each day. Make sure you take aspirin and not another kind of pain reliever, such as acetaminophen (Tylenol). When should you call for help? Call 911 if you have symptoms of a heart attack. These may include:    Chest pain or pressure, or a strange feeling in the chest.     Sweating.     Shortness of breath.     Pain, pressure, or a strange feeling in the back, neck, jaw, or upper belly or in one or both shoulders or arms.     Lightheadedness or sudden weakness.     A fast or irregular heartbeat. After you call 911, the  may tell you to chew 1 adult-strength or 2 to 4 low-dose aspirin. Wait for an ambulance.  Do not try to drive yourself. Watch closely for changes in your health, and be sure to contact your doctor if you have any problems. Where can you learn more? Go to http://www.webb.com/ and enter F075 to learn more about \"A Healthy Heart: Care Instructions. \"  Current as of: September 7, 2022               Content Version: 13.5  © 2006-2022 [employer]. Care instructions adapted under license by [facility] ([facility]). If you have questions about a medical condition or this instruction, always ask your healthcare professional. [address] any warranty or liability for your use of this information. Personalized Preventive Plan for Tim Stover - 3/3/2023  Medicare offers a range of preventive health benefits. Some of the tests and screenings are paid in full while other may be subject to a deductible, co-insurance, and/or copay. Some of these benefits include a comprehensive review of your medical history including lifestyle, illnesses that may run in your family, and various assessments and screenings as appropriate. After reviewing your medical record and screening and assessments performed today your provider may have ordered immunizations, labs, imaging, and/or referrals for you. A list of these orders (if applicable) as well as your Preventive Care list are included within your After Visit Summary for your review. Other Preventive Recommendations:    A preventive eye exam performed by an eye specialist is recommended every 1-2 years to screen for glaucoma; cataracts, macular degeneration, and other eye disorders. A preventive dental visit is recommended every 6 months. Try to get at least 150 minutes of exercise per week or 10,000 steps per day on a pedometer . Order or download the FREE \"Exercise & Physical Activity: Your Everyday Guide\" from [employer]. Call 1-469.371.2691 or search [employer] online.   You need 8009-9979 mg of calcium and 3987-4463 IU of vitamin D per day. It is possible to meet your calcium requirement with diet alone, but a vitamin D supplement is usually necessary to meet this goal.  When exposed to the sun, use a sunscreen that protects against both UVA and UVB radiation with an SPF of 30 or greater. Reapply every 2 to 3 hours or after sweating, drying off with a towel, or swimming. Always wear a seat belt when traveling in a car. Always wear a helmet when riding a bicycle or motorcycle.

## 2023-03-03 NOTE — PROGRESS NOTES
Antwan Jiang (:  1955) is a 74 y.o. male,Established patient, here for evaluation of the following chief complaint(s):  Medicare AWV and Other (Discuss stress test, states he couldn't get it done )         ASSESSMENT/PLAN:  1. Welcome to Medicare preventive visit  2. Chest pain, unspecified type  -     NM MYOCARDIAL SPECT REST EXERCISE OR RX; Future  -    Pt has ankle pain and does not think that he can successfully do an exercise stress test. Will order NM stress  3. Mixed hyperlipidemia  -     LIPID PANEL; Future  -   continue to work on lifestyle management. Consider starting statin if pt is agreeable  4. HTN (hypertension), benign  -     Comprehensive Metabolic Panel; Future  -     CBC with Auto Differential; Future  -     hydroCHLOROthiazide (HYDRODIURIL) 25 MG tablet; Take 1 tablet by mouth every morning, Disp-90 tablet, R-1Normal  -     blood pressure is well controlled, continue with atenolol and HCTZ. Will monitor renal function as he previously had a drop in kidney function at his last appointment  5. Seasonal allergic rhinitis due to pollen  -     cetirizine (ZYRTEC) 10 MG tablet; Take 1 tablet by mouth daily, Disp-90 tablet, R-1Normal        -   trial zyrtec prn for allergies    Return in about 6 months (around 9/3/2023) for HTN.          Subjective   SUBJECTIVE/OBJECTIVE:  HPI    HTN:  Current medication: HCTZ and atenolol  Home blood pressure readings: 125-120/85 (always less than 90)  Reports: continues to have intermittent chest pain, substernal, very mild per pt, not changing since our last appointment  Denies: lower extremity edema, heart palpitations, headache    Allergic rhinitis  Having some rhinorrhea and congestion  Denies: ear pain, sore throat, headache  Taking sudafed as needed    HLD:  Pt has declined statins in the past  The 10-year ASCVD risk score (Itzel HERRERA, et al., 2019) is: 16.5%    Values used to calculate the score:      Age: 74 years      Sex: Male      Is Non- : No      Diabetic: No      Tobacco smoker: No      Systolic Blood Pressure: 391 mmHg      Is BP treated: Yes      HDL Cholesterol: 45 mg/dL      Total Cholesterol: 233 mg/dL    Stopped alcohol and smoking one year ago      Review of Systems   Constitutional:  Negative for diaphoresis, fatigue and unexpected weight change. HENT:  Positive for congestion, postnasal drip and rhinorrhea. Negative for sinus pressure and sinus pain. Respiratory:  Negative for chest tightness, shortness of breath and wheezing. Cardiovascular:  Positive for chest pain. Negative for palpitations and leg swelling. Neurological:  Negative for dizziness, light-headedness and headaches. Hematological:  Does not bruise/bleed easily. Objective   Physical Exam  Vitals reviewed. Constitutional:       Appearance: Normal appearance. HENT:      Head: Normocephalic and atraumatic. Eyes:      Conjunctiva/sclera: Conjunctivae normal.   Neck:      Thyroid: No thyromegaly. Vascular: No carotid bruit. Cardiovascular:      Rate and Rhythm: Normal rate and regular rhythm. Heart sounds: Normal heart sounds. Pulmonary:      Effort: Pulmonary effort is normal.      Breath sounds: Normal breath sounds. Abdominal:      General: Abdomen is flat. Bowel sounds are normal.      Palpations: Abdomen is soft. Musculoskeletal:      Right lower leg: No edema. Left lower leg: No edema. Neurological:      General: No focal deficit present. Mental Status: He is alert and oriented to person, place, and time. Psychiatric:         Mood and Affect: Mood normal.         Behavior: Behavior normal.         Thought Content: Thought content normal.         Judgment: Judgment normal.                An electronic signature was used to authenticate this note.     --JEAN-PAUL Gerber

## 2023-03-03 NOTE — PROGRESS NOTES
Medicare Annual Wellness Visit    Rosaura Lira is here for Medicare AWV and Other (Discuss stress test, states he couldn't get it done )    Assessment & Plan   Welcome to Medicare preventive visit  -   Paperwork provided for Living Will, brief description provided. Pt will return for fasting lab work    Recommendations for Preventive Services Due: see orders and patient instructions/AVS.  Recommended screening schedule for the next 5-10 years is provided to the patient in written form: see Patient Instructions/AVS.     No follow-ups on file. Subjective     Patient's complete Health Risk Assessment and screening values have been reviewed and are found in Flowsheets. The following problems were reviewed today and where indicated follow up appointments were made and/or referrals ordered. Positive Risk Factor Screenings with Interventions:                    Vision Screen:  Do you have difficulty driving, watching TV, or doing any of your daily activities because of your eyesight?: No  Have you had an eye exam within the past year?: (!) No  No results found. Interventions:   Patient encouraged to make appointment with their eye specialist    Safety:  Do you have either shower bars, grab bars, non-slip mats or non-slip surfaces in your shower or bathtub?: (!) No  Interventions:  Patient declined any further interventions or treatment     Advanced Directives:  Do you have a Living Will?: (!) No    Intervention:  has NO advanced directive - information provided                 Objective   Vitals:    03/03/23 0917   BP: 124/78   Pulse: 82   SpO2: 98%   Weight: 209 lb (94.8 kg)   Height: 6' (1.829 m)      Body mass index is 28.35 kg/m². Allergies   Allergen Reactions    Adhesive Tape Dermatitis     Prior to Visit Medications    Medication Sig Taking?  Authorizing Provider   cetirizine (ZYRTEC) 10 MG tablet Take 1 tablet by mouth daily Yes JEAN-PAUL Miller   atenolol (TENORMIN) 100 MG tablet TAKE 1 TABLET BY MOUTH EVERY DAY Yes JEAN-PAUL Lang   hydroCHLOROthiazide (HYDRODIURIL) 25 MG tablet Take 1 tablet by mouth every morning Yes JEAN-PAUL Lang   lisinopril (PRINIVIL;ZESTRIL) 20 MG tablet Take 1 tablet by mouth daily  Patient not taking: No sig reported  JEAN-PAUL Lang       CareTeam (Including outside providers/suppliers regularly involved in providing care):   Patient Care Team:  JEAN-PAUL Lang as PCP - General (Physician Assistant)  JEAN-PAUL Lang as PCP - Empaneled Provider     Reviewed and updated this visit:  Tobacco  Allergies  Meds  Med Hx  Surg Hx  Soc Hx  Fam Hx             JEAN-PAUL Lang

## 2023-03-07 DIAGNOSIS — I10 HTN (HYPERTENSION), BENIGN: ICD-10-CM

## 2023-03-07 DIAGNOSIS — E78.2 MIXED HYPERLIPIDEMIA: ICD-10-CM

## 2023-03-07 LAB
A/G RATIO: 2 (ref 1.1–2.2)
ALBUMIN SERPL-MCNC: 4.6 G/DL (ref 3.4–5)
ALP BLD-CCNC: 77 U/L (ref 40–129)
ALT SERPL-CCNC: 26 U/L (ref 10–40)
ANION GAP SERPL CALCULATED.3IONS-SCNC: 13 MMOL/L (ref 3–16)
AST SERPL-CCNC: 18 U/L (ref 15–37)
BASOPHILS ABSOLUTE: 0.1 K/UL (ref 0–0.2)
BASOPHILS RELATIVE PERCENT: 1.5 %
BILIRUB SERPL-MCNC: 0.7 MG/DL (ref 0–1)
BUN BLDV-MCNC: 17 MG/DL (ref 7–20)
CALCIUM SERPL-MCNC: 9.9 MG/DL (ref 8.3–10.6)
CHLORIDE BLD-SCNC: 100 MMOL/L (ref 99–110)
CHOLESTEROL, TOTAL: 193 MG/DL (ref 0–199)
CO2: 27 MMOL/L (ref 21–32)
CREAT SERPL-MCNC: 1.1 MG/DL (ref 0.8–1.3)
EOSINOPHILS ABSOLUTE: 0.3 K/UL (ref 0–0.6)
EOSINOPHILS RELATIVE PERCENT: 5.3 %
GFR SERPL CREATININE-BSD FRML MDRD: >60 ML/MIN/{1.73_M2}
GLUCOSE BLD-MCNC: 99 MG/DL (ref 70–99)
HCT VFR BLD CALC: 53.5 % (ref 40.5–52.5)
HDLC SERPL-MCNC: 40 MG/DL (ref 40–60)
HEMOGLOBIN: 18.2 G/DL (ref 13.5–17.5)
LDL CHOLESTEROL CALCULATED: 120 MG/DL
LYMPHOCYTES ABSOLUTE: 2.4 K/UL (ref 1–5.1)
LYMPHOCYTES RELATIVE PERCENT: 37 %
MCH RBC QN AUTO: 29.8 PG (ref 26–34)
MCHC RBC AUTO-ENTMCNC: 34 G/DL (ref 31–36)
MCV RBC AUTO: 87.6 FL (ref 80–100)
MONOCYTES ABSOLUTE: 0.6 K/UL (ref 0–1.3)
MONOCYTES RELATIVE PERCENT: 9.5 %
NEUTROPHILS ABSOLUTE: 3.1 K/UL (ref 1.7–7.7)
NEUTROPHILS RELATIVE PERCENT: 46.7 %
PDW BLD-RTO: 14.5 % (ref 12.4–15.4)
PLATELET # BLD: 204 K/UL (ref 135–450)
PMV BLD AUTO: 9.2 FL (ref 5–10.5)
POTASSIUM SERPL-SCNC: 4.4 MMOL/L (ref 3.5–5.1)
RBC # BLD: 6.11 M/UL (ref 4.2–5.9)
SODIUM BLD-SCNC: 140 MMOL/L (ref 136–145)
TOTAL PROTEIN: 6.9 G/DL (ref 6.4–8.2)
TRIGL SERPL-MCNC: 166 MG/DL (ref 0–150)
VLDLC SERPL CALC-MCNC: 33 MG/DL
WBC # BLD: 6.6 K/UL (ref 4–11)

## 2023-03-08 DIAGNOSIS — R71.8 ELEVATED RED BLOOD CELL COUNT: Primary | ICD-10-CM

## 2023-03-08 PROBLEM — I70.0 ATHEROSCLEROSIS OF ABDOMINAL AORTA (HCC): Status: ACTIVE | Noted: 2023-03-08

## 2023-03-08 RX ORDER — ATORVASTATIN CALCIUM 40 MG/1
40 TABLET, FILM COATED ORAL DAILY
Qty: 90 TABLET | Refills: 1 | Status: SHIPPED | OUTPATIENT
Start: 2023-03-08

## 2023-03-24 ENCOUNTER — HOSPITAL ENCOUNTER (OUTPATIENT)
Dept: ULTRASOUND IMAGING | Age: 68
Discharge: HOME | End: 2023-03-24
Payer: MEDICARE

## 2023-03-24 ENCOUNTER — HOSPITAL ENCOUNTER (OUTPATIENT)
Dept: NUCLEAR MEDICINE | Age: 68
Discharge: HOME | End: 2023-03-24
Payer: MEDICARE

## 2023-03-24 ENCOUNTER — HOSPITAL ENCOUNTER (OUTPATIENT)
Dept: NON INVASIVE DIAGNOSTICS | Age: 68
Discharge: HOME | End: 2023-03-24
Payer: MEDICARE

## 2023-03-24 DIAGNOSIS — Z13.6 SCREENING FOR AAA (ABDOMINAL AORTIC ANEURYSM): ICD-10-CM

## 2023-03-24 DIAGNOSIS — R07.9 CHEST PAIN, UNSPECIFIED TYPE: ICD-10-CM

## 2023-03-24 LAB
LV EF: 68 %
LVEF MODALITY: NORMAL

## 2023-03-24 PROCEDURE — 3430000000 HC RX DIAGNOSTIC RADIOPHARMACEUTICAL: Performed by: PHYSICIAN ASSISTANT

## 2023-03-24 PROCEDURE — 6360000002 HC RX W HCPCS: Performed by: PHYSICIAN ASSISTANT

## 2023-03-24 PROCEDURE — A9502 TC99M TETROFOSMIN: HCPCS | Performed by: PHYSICIAN ASSISTANT

## 2023-03-24 PROCEDURE — 76706 US ABDL AORTA SCREEN AAA: CPT

## 2023-03-24 PROCEDURE — 93017 CV STRESS TEST TRACING ONLY: CPT

## 2023-03-24 PROCEDURE — 78452 HT MUSCLE IMAGE SPECT MULT: CPT

## 2023-03-24 RX ADMIN — REGADENOSON 0.4 MG: 0.08 INJECTION, SOLUTION INTRAVENOUS at 10:45

## 2023-03-24 RX ADMIN — TETROFOSMIN 30.2 MILLICURIE: 1.38 INJECTION, POWDER, LYOPHILIZED, FOR SOLUTION INTRAVENOUS at 10:45

## 2023-03-24 RX ADMIN — TETROFOSMIN 10.9 MILLICURIE: 1.38 INJECTION, POWDER, LYOPHILIZED, FOR SOLUTION INTRAVENOUS at 09:30

## 2023-03-24 NOTE — PROGRESS NOTES
Pt completed stress portion of cardiac stress test. Pt c/o sob,and facial flushing, all symptoms resolved in recovery. Pt is discharged to nuclear department for final scan. Nuclear tech will remove PIV. Discharge instructions given to pt. Pt verbalizes understanding to discharge instructions.

## 2023-05-07 DIAGNOSIS — I10 HTN (HYPERTENSION), BENIGN: ICD-10-CM

## 2023-05-08 RX ORDER — ATENOLOL 100 MG/1
100 TABLET ORAL DAILY
Qty: 90 TABLET | Refills: 1 | Status: SHIPPED | OUTPATIENT
Start: 2023-05-08

## 2023-05-08 NOTE — TELEPHONE ENCOUNTER
.Refill Request     CONFIRM preferred pharmacy with the patient. If Mail Order Rx - Pend for 90 day refill. Last Seen: Last Seen Department: 3/3/2023  Last Seen by PCP: 3/3/2023    Last Written: 1-25-23 90 with 1     If no future appointment scheduled:  Review the last OV with PCP and review information for follow-up visit,  Route STAFF MESSAGE with patient name to the LTAC, located within [facility] for scheduling with the following information:            -  Timing of next visit           -  Visit type ie Physical, OV, etc           -  Diagnoses/Reason ie. COPD, HTN - Do not use MEDICATION, Follow-up or CHECK UP - Give reason for visit      Next Appointment:   Future Appointments   Date Time Provider Ralph Grant   9/8/2023  8:00 AM JEAN-PAUL Sierra  Qing - JODIE       Message sent to [address] [place] [address] to schedule appt with patient?   N/A      Requested Prescriptions     Pending Prescriptions Disp Refills    atenolol (TENORMIN) 100 MG tablet 90 tablet 1     Sig: Take 1 tablet by mouth daily

## 2023-08-25 DIAGNOSIS — I10 HTN (HYPERTENSION), BENIGN: ICD-10-CM

## 2023-08-25 DIAGNOSIS — R71.8 ELEVATED RED BLOOD CELL COUNT: ICD-10-CM

## 2023-08-25 LAB
ALBUMIN SERPL-MCNC: 4.7 G/DL (ref 3.4–5)
ANION GAP SERPL CALCULATED.3IONS-SCNC: 13 MMOL/L (ref 3–16)
BASOPHILS # BLD: 0.1 K/UL (ref 0–0.2)
BASOPHILS NFR BLD: 1.5 %
BUN SERPL-MCNC: 12 MG/DL (ref 7–20)
CALCIUM SERPL-MCNC: 9.7 MG/DL (ref 8.3–10.6)
CHLORIDE SERPL-SCNC: 100 MMOL/L (ref 99–110)
CO2 SERPL-SCNC: 24 MMOL/L (ref 21–32)
CREAT SERPL-MCNC: 1 MG/DL (ref 0.8–1.3)
DEPRECATED RDW RBC AUTO: 13.8 % (ref 12.4–15.4)
EOSINOPHIL # BLD: 0.4 K/UL (ref 0–0.6)
EOSINOPHIL NFR BLD: 5.9 %
GFR SERPLBLD CREATININE-BSD FMLA CKD-EPI: >60 ML/MIN/{1.73_M2}
GLUCOSE SERPL-MCNC: 106 MG/DL (ref 70–99)
HCT VFR BLD AUTO: 51.8 % (ref 40.5–52.5)
HGB BLD-MCNC: 18.5 G/DL (ref 13.5–17.5)
LYMPHOCYTES # BLD: 2.2 K/UL (ref 1–5.1)
LYMPHOCYTES NFR BLD: 34.3 %
MCH RBC QN AUTO: 31.5 PG (ref 26–34)
MCHC RBC AUTO-ENTMCNC: 35.7 G/DL (ref 31–36)
MCV RBC AUTO: 88.3 FL (ref 80–100)
MONOCYTES # BLD: 0.5 K/UL (ref 0–1.3)
MONOCYTES NFR BLD: 7.9 %
NEUTROPHILS # BLD: 3.2 K/UL (ref 1.7–7.7)
NEUTROPHILS NFR BLD: 50.4 %
PATH INTERP BLD-IMP: NORMAL
PHOSPHATE SERPL-MCNC: 2.8 MG/DL (ref 2.5–4.9)
PLATELET # BLD AUTO: 208 K/UL (ref 135–450)
PMV BLD AUTO: 9.6 FL (ref 5–10.5)
POTASSIUM SERPL-SCNC: 4.1 MMOL/L (ref 3.5–5.1)
RBC # BLD AUTO: 5.87 M/UL (ref 4.2–5.9)
SODIUM SERPL-SCNC: 137 MMOL/L (ref 136–145)
WBC # BLD AUTO: 6.4 K/UL (ref 4–11)

## 2023-08-27 LAB — EPO SERPL-ACNC: 11 MU/ML (ref 4–27)

## 2023-08-28 LAB — PATH INTERP BLD-IMP: NORMAL

## 2023-08-30 ENCOUNTER — OFFICE VISIT (OUTPATIENT)
Dept: FAMILY MEDICINE CLINIC | Age: 68
End: 2023-08-30
Payer: MEDICARE

## 2023-08-30 VITALS
DIASTOLIC BLOOD PRESSURE: 64 MMHG | WEIGHT: 211.6 LBS | OXYGEN SATURATION: 97 % | HEART RATE: 67 BPM | TEMPERATURE: 97.7 F | SYSTOLIC BLOOD PRESSURE: 110 MMHG | BODY MASS INDEX: 28.7 KG/M2

## 2023-08-30 DIAGNOSIS — I70.0 ATHEROSCLEROSIS OF ABDOMINAL AORTA (HCC): ICD-10-CM

## 2023-08-30 DIAGNOSIS — H61.22 IMPACTED CERUMEN OF LEFT EAR: Primary | ICD-10-CM

## 2023-08-30 PROBLEM — D75.1 SECONDARY POLYCYTHEMIA: Status: ACTIVE | Noted: 2023-08-30

## 2023-08-30 PROCEDURE — 1123F ACP DISCUSS/DSCN MKR DOCD: CPT | Performed by: NURSE PRACTITIONER

## 2023-08-30 PROCEDURE — G8417 CALC BMI ABV UP PARAM F/U: HCPCS | Performed by: NURSE PRACTITIONER

## 2023-08-30 PROCEDURE — 99213 OFFICE O/P EST LOW 20 MIN: CPT | Performed by: NURSE PRACTITIONER

## 2023-08-30 PROCEDURE — 3017F COLORECTAL CA SCREEN DOC REV: CPT | Performed by: NURSE PRACTITIONER

## 2023-08-30 PROCEDURE — 3078F DIAST BP <80 MM HG: CPT | Performed by: NURSE PRACTITIONER

## 2023-08-30 PROCEDURE — 1036F TOBACCO NON-USER: CPT | Performed by: NURSE PRACTITIONER

## 2023-08-30 PROCEDURE — G8427 DOCREV CUR MEDS BY ELIG CLIN: HCPCS | Performed by: NURSE PRACTITIONER

## 2023-08-30 PROCEDURE — 3074F SYST BP LT 130 MM HG: CPT | Performed by: NURSE PRACTITIONER

## 2023-08-30 ASSESSMENT — PATIENT HEALTH QUESTIONNAIRE - PHQ9
SUM OF ALL RESPONSES TO PHQ QUESTIONS 1-9: 0
1. LITTLE INTEREST OR PLEASURE IN DOING THINGS: 0
SUM OF ALL RESPONSES TO PHQ9 QUESTIONS 1 & 2: 0
2. FEELING DOWN, DEPRESSED OR HOPELESS: 0
SUM OF ALL RESPONSES TO PHQ QUESTIONS 1-9: 0

## 2023-08-30 NOTE — PROGRESS NOTES
Ben Khan (:  1955) is a 79 y.o. male,Established patient, here for evaluation of the following chief complaint(s):  Ear Fullness (Left ear)         ASSESSMENT/PLAN:  1. Impacted cerumen of left ear  2. Atherosclerosis of abdominal aorta (HCC)      Left canal irrigated with warm water with full clearing. TM clear intact. Hearing restored. Tolerated well. Return if no improvement or worsens      No follow-ups on file. Subjective   SUBJECTIVE/OBJECTIVE:  HPI     noted left ear with decreased hearing. Denies pain, pressure, fevers. Has DOT physical due 2023    AAA noted previously. Follow up [address] 3/2023 stable. Review of Systems   All other systems reviewed and are negative. Objective   Physical Exam  Constitutional:       Appearance: Normal appearance. He is well-developed. HENT:      Right Ear: Tympanic membrane and ear canal normal.      Ears:      Comments: Left canal occluded with dark cerumen, neg for redness  Cardiovascular:      Rate and Rhythm: Normal rate and regular rhythm. Heart sounds: Normal heart sounds. Pulmonary:      Effort: Pulmonary effort is normal.      Breath sounds: Normal breath sounds. Abdominal:      General: Bowel sounds are normal.      Palpations: Abdomen is soft. Musculoskeletal:         General: Normal range of motion. Skin:     General: Skin is warm and dry. Neurological:      Mental Status: He is alert and oriented to person, place, and time.                 An electronic signature was used to authenticate this note.    --FREDI PEREZ, JEREMIAH - CNP

## 2023-09-07 SDOH — ECONOMIC STABILITY: FOOD INSECURITY: WITHIN THE PAST 12 MONTHS, YOU WORRIED THAT YOUR FOOD WOULD RUN OUT BEFORE YOU GOT MONEY TO BUY MORE.: NEVER TRUE

## 2023-09-07 SDOH — ECONOMIC STABILITY: FOOD INSECURITY: WITHIN THE PAST 12 MONTHS, THE FOOD YOU BOUGHT JUST DIDN'T LAST AND YOU DIDN'T HAVE MONEY TO GET MORE.: NEVER TRUE

## 2023-09-07 SDOH — ECONOMIC STABILITY: INCOME INSECURITY: HOW HARD IS IT FOR YOU TO PAY FOR THE VERY BASICS LIKE FOOD, HOUSING, MEDICAL CARE, AND HEATING?: NOT VERY HARD

## 2023-09-08 ENCOUNTER — OFFICE VISIT (OUTPATIENT)
Dept: FAMILY MEDICINE CLINIC | Age: 68
End: 2023-09-08
Payer: MEDICARE

## 2023-09-08 VITALS
OXYGEN SATURATION: 97 % | HEART RATE: 76 BPM | DIASTOLIC BLOOD PRESSURE: 86 MMHG | WEIGHT: 212 LBS | SYSTOLIC BLOOD PRESSURE: 112 MMHG | BODY MASS INDEX: 28.75 KG/M2

## 2023-09-08 DIAGNOSIS — I10 HTN (HYPERTENSION), BENIGN: ICD-10-CM

## 2023-09-08 DIAGNOSIS — Z12.5 PROSTATE CANCER SCREENING: Primary | ICD-10-CM

## 2023-09-08 DIAGNOSIS — E78.2 MIXED HYPERLIPIDEMIA: ICD-10-CM

## 2023-09-08 PROCEDURE — 3074F SYST BP LT 130 MM HG: CPT | Performed by: PHYSICIAN ASSISTANT

## 2023-09-08 PROCEDURE — G8417 CALC BMI ABV UP PARAM F/U: HCPCS | Performed by: PHYSICIAN ASSISTANT

## 2023-09-08 PROCEDURE — G8427 DOCREV CUR MEDS BY ELIG CLIN: HCPCS | Performed by: PHYSICIAN ASSISTANT

## 2023-09-08 PROCEDURE — 99214 OFFICE O/P EST MOD 30 MIN: CPT | Performed by: PHYSICIAN ASSISTANT

## 2023-09-08 PROCEDURE — 1036F TOBACCO NON-USER: CPT | Performed by: PHYSICIAN ASSISTANT

## 2023-09-08 PROCEDURE — 1123F ACP DISCUSS/DSCN MKR DOCD: CPT | Performed by: PHYSICIAN ASSISTANT

## 2023-09-08 PROCEDURE — 3079F DIAST BP 80-89 MM HG: CPT | Performed by: PHYSICIAN ASSISTANT

## 2023-09-08 PROCEDURE — 3017F COLORECTAL CA SCREEN DOC REV: CPT | Performed by: PHYSICIAN ASSISTANT

## 2023-09-08 RX ORDER — HYDROCHLOROTHIAZIDE 25 MG/1
25 TABLET ORAL EVERY MORNING
Qty: 90 TABLET | Refills: 1 | Status: SHIPPED | OUTPATIENT
Start: 2023-09-08

## 2023-09-08 RX ORDER — ATENOLOL 100 MG/1
100 TABLET ORAL DAILY
Qty: 90 TABLET | Refills: 1 | Status: SHIPPED | OUTPATIENT
Start: 2023-09-08

## 2023-09-08 ASSESSMENT — ENCOUNTER SYMPTOMS
COLOR CHANGE: 0
WHEEZING: 0
SHORTNESS OF BREATH: 0

## 2023-09-08 NOTE — PROGRESS NOTES
Ulices Martinez (:  1955) is a 79 y.o. male,Established patient, here for evaluation of the following chief complaint(s):  Hypertension         ASSESSMENT/PLAN:  1. Prostate cancer screening  -     PSA Screening; Future  2. HTN (hypertension), benign  -     hydroCHLOROthiazide (HYDRODIURIL) 25 MG tablet; Take 1 tablet by mouth every morning, Disp-90 tablet, R-1Normal  -     atenolol (TENORMIN) 100 MG tablet; Take 1 tablet by mouth daily, Disp-90 tablet, R-1Normal  -     Comprehensive Metabolic Panel; Future  -     well controlled, continue with hctz and atenolol. Follow up in 6 months or sooner if needed  3. Mixed hyperlipidemia  -     LIPID PANEL; Future       -      work on improving diet, limiting sugar, and increasing protein    Return for AWV after . Subjective   SUBJECTIVE/OBJECTIVE:  HPI  HTN:  Current medication: atenolol and hctz  Side effects: none  Home blood pressure readings: 120/80  Denies: chest pressure, shortness of breath, headache and lower extremity edema  Walks 3-5 miles per day  Not eating salt    HLD:  Current medication: lipitor  Pt stopped this medication  The 10-year ASCVD risk score (Itzel DK, et al., 2019) is: 14%    Values used to calculate the score:      Age: 79 years      Sex: Male      Is Non- : No      Diabetic: No      Tobacco smoker: No      Systolic Blood Pressure: 243 mmHg      Is BP treated: Yes      HDL Cholesterol: 40 mg/dL      Total Cholesterol: 193 mg/dL    Pt gets flu shot at his wife's work  Pt declines pneumococcal    Review of Systems   Constitutional:  Negative for diaphoresis, fatigue and unexpected weight change. Respiratory:  Negative for shortness of breath and wheezing. Cardiovascular:  Negative for chest pain, palpitations and leg swelling. Skin:  Negative for color change. Neurological:  Negative for dizziness, light-headedness and headaches. Objective   Physical Exam  Vitals reviewed.

## 2023-09-11 ENCOUNTER — TELEPHONE (OUTPATIENT)
Dept: FAMILY MEDICINE CLINIC | Age: 68
End: 2023-09-11

## 2023-09-11 NOTE — TELEPHONE ENCOUNTER
Received phone call from the pt in regards to he has swelling and soreness and a blister on the left testicle that started yesterday. Pt stated that today he has one on the right testicle with both of them having discharged. Placed pt on hold went to speak with Keo Aguilar and Tanya Alvarez MA per Brenda Pulliam she advised pt to be seen by an urgent care with the tenderness achy and swelling hot to touch and not to wait to be seen tomorrow by Jose Bartlett, pt advised to go be evaluated by an Urgent care pt agreeable.

## 2024-02-18 ASSESSMENT — PATIENT HEALTH QUESTIONNAIRE - PHQ9
SUM OF ALL RESPONSES TO PHQ9 QUESTIONS 1 & 2: 2
SUM OF ALL RESPONSES TO PHQ9 QUESTIONS 1 & 2: 2
SUM OF ALL RESPONSES TO PHQ QUESTIONS 1-9: 2
SUM OF ALL RESPONSES TO PHQ QUESTIONS 1-9: 2
2. FEELING DOWN, DEPRESSED OR HOPELESS: SEVERAL DAYS
1. LITTLE INTEREST OR PLEASURE IN DOING THINGS: 1
2. FEELING DOWN, DEPRESSED OR HOPELESS: 1
1. LITTLE INTEREST OR PLEASURE IN DOING THINGS: SEVERAL DAYS
SUM OF ALL RESPONSES TO PHQ QUESTIONS 1-9: 2
SUM OF ALL RESPONSES TO PHQ QUESTIONS 1-9: 2

## 2024-02-19 ENCOUNTER — HOSPITAL ENCOUNTER (OUTPATIENT)
Dept: GENERAL RADIOLOGY | Age: 69
Discharge: HOME | End: 2024-02-19
Payer: MEDICARE

## 2024-02-19 ENCOUNTER — OFFICE VISIT (OUTPATIENT)
Dept: FAMILY MEDICINE CLINIC | Age: 69
End: 2024-02-19
Payer: MEDICARE

## 2024-02-19 VITALS
SYSTOLIC BLOOD PRESSURE: 100 MMHG | WEIGHT: 215 LBS | BODY MASS INDEX: 29.16 KG/M2 | OXYGEN SATURATION: 99 % | HEART RATE: 64 BPM | DIASTOLIC BLOOD PRESSURE: 60 MMHG

## 2024-02-19 DIAGNOSIS — G89.29 CHRONIC RIGHT SHOULDER PAIN: ICD-10-CM

## 2024-02-19 DIAGNOSIS — M25.511 CHRONIC RIGHT SHOULDER PAIN: ICD-10-CM

## 2024-02-19 DIAGNOSIS — G25.0 ESSENTIAL TREMOR: ICD-10-CM

## 2024-02-19 DIAGNOSIS — G89.29 CHRONIC RIGHT SHOULDER PAIN: Primary | ICD-10-CM

## 2024-02-19 DIAGNOSIS — M75.41 SHOULDER IMPINGEMENT SYNDROME, RIGHT: ICD-10-CM

## 2024-02-19 DIAGNOSIS — M25.511 CHRONIC RIGHT SHOULDER PAIN: Primary | ICD-10-CM

## 2024-02-19 DIAGNOSIS — J32.9 CHRONIC SINUSITIS, UNSPECIFIED LOCATION: ICD-10-CM

## 2024-02-19 PROCEDURE — 3078F DIAST BP <80 MM HG: CPT | Performed by: PHYSICIAN ASSISTANT

## 2024-02-19 PROCEDURE — 3017F COLORECTAL CA SCREEN DOC REV: CPT | Performed by: PHYSICIAN ASSISTANT

## 2024-02-19 PROCEDURE — G8417 CALC BMI ABV UP PARAM F/U: HCPCS | Performed by: PHYSICIAN ASSISTANT

## 2024-02-19 PROCEDURE — 99214 OFFICE O/P EST MOD 30 MIN: CPT | Performed by: PHYSICIAN ASSISTANT

## 2024-02-19 PROCEDURE — G8484 FLU IMMUNIZE NO ADMIN: HCPCS | Performed by: PHYSICIAN ASSISTANT

## 2024-02-19 PROCEDURE — 1036F TOBACCO NON-USER: CPT | Performed by: PHYSICIAN ASSISTANT

## 2024-02-19 PROCEDURE — 73030 X-RAY EXAM OF SHOULDER: CPT

## 2024-02-19 PROCEDURE — 3074F SYST BP LT 130 MM HG: CPT | Performed by: PHYSICIAN ASSISTANT

## 2024-02-19 PROCEDURE — 1123F ACP DISCUSS/DSCN MKR DOCD: CPT | Performed by: PHYSICIAN ASSISTANT

## 2024-02-19 PROCEDURE — G8427 DOCREV CUR MEDS BY ELIG CLIN: HCPCS | Performed by: PHYSICIAN ASSISTANT

## 2024-02-19 RX ORDER — PREDNISONE 10 MG/1
TABLET ORAL
Qty: 30 TABLET | Refills: 0 | Status: SHIPPED | OUTPATIENT
Start: 2024-02-19 | End: 2024-03-02

## 2024-02-19 RX ORDER — FLUTICASONE PROPIONATE 50 MCG
1 SPRAY, SUSPENSION (ML) NASAL DAILY
Qty: 16 G | Refills: 0 | Status: SHIPPED | OUTPATIENT
Start: 2024-02-19

## 2024-02-19 RX ORDER — PRIMIDONE 50 MG/1
25 TABLET ORAL DAILY
Qty: 15 TABLET | Refills: 0 | Status: SHIPPED | OUTPATIENT
Start: 2024-02-19 | End: 2024-03-20

## 2024-02-19 ASSESSMENT — ENCOUNTER SYMPTOMS
RHINORRHEA: 1
COLOR CHANGE: 0
BACK PAIN: 0

## 2024-02-19 NOTE — PROGRESS NOTES
Jayro Mckoy (:  1955) is a 68 y.o. male,Established patient, here for evaluation of the following chief complaint(s):  Numbness (Right arm, constant, for a couple months ), Shoulder Pain (Right, feels like its about to drop out of the socket, x's 6 months, no known injury ), and Tremors (Seen neurologist in the past but needs a new one since his old doc is too busy )         ASSESSMENT/PLAN:  1. Chronic right shoulder pain  -     XR SHOULDER LEFT (MIN 2 VIEWS); Future: possible rotator cuff tendonitis vs mod-severe arthritis. Will likely need a referral to ortho  2. Shoulder impingement syndrome, right  -     predniSONE (DELTASONE) 10 MG tablet; Take 4 tablets by mouth daily for 3 days, THEN 3 tablets daily for 3 days, THEN 2 tablets daily for 3 days, THEN 1 tablet daily for 3 days., Disp-30 tablet, R-0Normal  -     will start him on steroids for improvement of pain  3. Essential tremor  -     primidone (MYSOLINE) 50 MG tablet; Take 0.5 tablets by mouth daily, Disp-15 tablet, R-0Normal  -    uncontrolled, affecting ADLs, will start with 25 mg and call in 7 days to see if this is effective. Medication risks, benefits and side effects discussed. He should start this medication after he completes the prednisone  4. Chronic sinusitis, unspecified location  -     fluticasone (FLONASE) 50 MCG/ACT nasal spray; 1 spray by Each Nostril route daily, Disp-16 g, R-0Normal        -   trial flonase, continue with zyrtec.         Subjective   SUBJECTIVE/OBJECTIVE:  HPI  Tremors  Location: both sides, worse on the left side than the right side  Occurring at rest and with some movement  He had previously seen Dr. Blancas for this and was told he had essential tremor but was unable to take the medications at that time due to his [employer] license  Affecting his ability to work and     tingling in right arm  Timing: several months  Associated symptoms: weakness in the right arm  Denies: numbness, discoloration    Shoulder

## 2024-03-16 DIAGNOSIS — G25.0 ESSENTIAL TREMOR: ICD-10-CM

## 2024-03-16 NOTE — TELEPHONE ENCOUNTER
Refill Request     CONFIRM preferred pharmacy with the patient.    If Mail Order Rx - Pend for 90 day refill.      Last Seen: Last Seen Department: 2/19/2024  Last Seen by PCP: 2/19/2024    Last Written: 2/19/2024    If no future appointment scheduled:  Review the last OV with PCP and review information for follow-up visit,  Route STAFF MESSAGE with patient name to the  Pool for scheduling with the following information:            -  Timing of next visit           -  Visit type ie Physical, OV, etc           -  Diagnoses/Reason ie. COPD, HTN - Do not use MEDICATION, Follow-up or CHECK UP - Give reason for visit      Next Appointment:   Future Appointments   Date Time Provider Department Center   6/19/2024  8:30 AM Junie Grace PA [facility]  [facility]ing - JODIE       Message sent to  to schedule appt with patient?  NO      Requested Prescriptions     Pending Prescriptions Disp Refills    primidone (MYSOLINE) 50 MG tablet 15 tablet 0     Sig: Take 0.5 tablets by mouth daily

## 2024-03-18 RX ORDER — PRIMIDONE 50 MG/1
50 TABLET ORAL DAILY
Qty: 30 TABLET | Refills: 0 | Status: SHIPPED | OUTPATIENT
Start: 2024-03-18 | End: 2024-04-17

## 2024-03-19 ENCOUNTER — OFFICE VISIT (OUTPATIENT)
Dept: FAMILY MEDICINE CLINIC | Age: 69
End: 2024-03-19
Payer: MEDICARE

## 2024-03-19 VITALS
HEIGHT: 72 IN | WEIGHT: 214 LBS | DIASTOLIC BLOOD PRESSURE: 70 MMHG | BODY MASS INDEX: 28.99 KG/M2 | HEART RATE: 60 BPM | TEMPERATURE: 97.4 F | OXYGEN SATURATION: 99 % | SYSTOLIC BLOOD PRESSURE: 114 MMHG

## 2024-03-19 DIAGNOSIS — I70.0 ATHEROSCLEROSIS OF ABDOMINAL AORTA (HCC): ICD-10-CM

## 2024-03-19 DIAGNOSIS — Z87.891 PERSONAL HISTORY OF TOBACCO USE: Primary | ICD-10-CM

## 2024-03-19 DIAGNOSIS — E78.2 MIXED HYPERLIPIDEMIA: ICD-10-CM

## 2024-03-19 DIAGNOSIS — R07.9 CHEST PAIN, UNSPECIFIED TYPE: ICD-10-CM

## 2024-03-19 DIAGNOSIS — K64.9 HEMORRHOIDS, UNSPECIFIED HEMORRHOID TYPE: ICD-10-CM

## 2024-03-19 DIAGNOSIS — I10 HTN (HYPERTENSION), BENIGN: ICD-10-CM

## 2024-03-19 PROCEDURE — G0296 VISIT TO DETERM LDCT ELIG: HCPCS | Performed by: PHYSICIAN ASSISTANT

## 2024-03-19 PROCEDURE — G8427 DOCREV CUR MEDS BY ELIG CLIN: HCPCS | Performed by: PHYSICIAN ASSISTANT

## 2024-03-19 PROCEDURE — G8417 CALC BMI ABV UP PARAM F/U: HCPCS | Performed by: PHYSICIAN ASSISTANT

## 2024-03-19 PROCEDURE — 3078F DIAST BP <80 MM HG: CPT | Performed by: PHYSICIAN ASSISTANT

## 2024-03-19 PROCEDURE — 3017F COLORECTAL CA SCREEN DOC REV: CPT | Performed by: PHYSICIAN ASSISTANT

## 2024-03-19 PROCEDURE — 1123F ACP DISCUSS/DSCN MKR DOCD: CPT | Performed by: PHYSICIAN ASSISTANT

## 2024-03-19 PROCEDURE — 1036F TOBACCO NON-USER: CPT | Performed by: PHYSICIAN ASSISTANT

## 2024-03-19 PROCEDURE — 3074F SYST BP LT 130 MM HG: CPT | Performed by: PHYSICIAN ASSISTANT

## 2024-03-19 PROCEDURE — G8484 FLU IMMUNIZE NO ADMIN: HCPCS | Performed by: PHYSICIAN ASSISTANT

## 2024-03-19 PROCEDURE — 99213 OFFICE O/P EST LOW 20 MIN: CPT | Performed by: PHYSICIAN ASSISTANT

## 2024-03-19 RX ORDER — ATENOLOL 50 MG/1
75 TABLET ORAL DAILY
Qty: 135 TABLET | Refills: 1 | Status: SHIPPED | OUTPATIENT
Start: 2024-03-19 | End: 2024-09-15

## 2024-03-19 RX ORDER — HYDROCORTISONE ACETATE 25 MG/1
25 SUPPOSITORY RECTAL EVERY 12 HOURS
Qty: 20 SUPPOSITORY | Refills: 0 | Status: SHIPPED | OUTPATIENT
Start: 2024-03-19 | End: 2024-03-29

## 2024-03-19 SDOH — ECONOMIC STABILITY: FOOD INSECURITY: WITHIN THE PAST 12 MONTHS, THE FOOD YOU BOUGHT JUST DIDN'T LAST AND YOU DIDN'T HAVE MONEY TO GET MORE.: NEVER TRUE

## 2024-03-19 SDOH — ECONOMIC STABILITY: FOOD INSECURITY: WITHIN THE PAST 12 MONTHS, YOU WORRIED THAT YOUR FOOD WOULD RUN OUT BEFORE YOU GOT MONEY TO BUY MORE.: NEVER TRUE

## 2024-03-19 SDOH — ECONOMIC STABILITY: INCOME INSECURITY: HOW HARD IS IT FOR YOU TO PAY FOR THE VERY BASICS LIKE FOOD, HOUSING, MEDICAL CARE, AND HEATING?: NOT HARD AT ALL

## 2024-03-19 ASSESSMENT — ENCOUNTER SYMPTOMS
CONSTIPATION: 0
VOMITING: 0
ABDOMINAL PAIN: 0
NAUSEA: 0
DIARRHEA: 0
COLOR CHANGE: 0
BLOOD IN STOOL: 1
WHEEZING: 0
SHORTNESS OF BREATH: 0
COUGH: 0
ABDOMINAL DISTENTION: 0

## 2024-03-19 ASSESSMENT — PATIENT HEALTH QUESTIONNAIRE - PHQ9
4. FEELING TIRED OR HAVING LITTLE ENERGY: NOT AT ALL
9. THOUGHTS THAT YOU WOULD BE BETTER OFF DEAD, OR OF HURTING YOURSELF: NOT AT ALL
2. FEELING DOWN, DEPRESSED OR HOPELESS: NOT AT ALL
3. TROUBLE FALLING OR STAYING ASLEEP: NOT AT ALL
7. TROUBLE CONCENTRATING ON THINGS, SUCH AS READING THE NEWSPAPER OR WATCHING TELEVISION: NOT AT ALL
SUM OF ALL RESPONSES TO PHQ QUESTIONS 1-9: 0
SUM OF ALL RESPONSES TO PHQ QUESTIONS 1-9: 0
5. POOR APPETITE OR OVEREATING: NOT AT ALL
SUM OF ALL RESPONSES TO PHQ9 QUESTIONS 1 & 2: 0
1. LITTLE INTEREST OR PLEASURE IN DOING THINGS: NOT AT ALL
8. MOVING OR SPEAKING SO SLOWLY THAT OTHER PEOPLE COULD HAVE NOTICED. OR THE OPPOSITE, BEING SO FIGETY OR RESTLESS THAT YOU HAVE BEEN MOVING AROUND A LOT MORE THAN USUAL: NOT AT ALL
6. FEELING BAD ABOUT YOURSELF - OR THAT YOU ARE A FAILURE OR HAVE LET YOURSELF OR YOUR FAMILY DOWN: NOT AT ALL
SUM OF ALL RESPONSES TO PHQ QUESTIONS 1-9: 0
SUM OF ALL RESPONSES TO PHQ QUESTIONS 1-9: 0

## 2024-03-19 NOTE — PROGRESS NOTES
Jayro Mckoy (:  1955) is a 68 y.o. male,Established patient, here for evaluation of the following chief complaint(s):  Other (Back pain x 1 week-lower back-has typical back pain-Work note to be off for rest of week due to back/Darker stool with blood-dark brown-with streaks of blood-brought in sample-denies stomach cramping /Discuss increased dose of primidone for tremors /)         ASSESSMENT/PLAN:  1. Personal history of tobacco use  -     WI VISIT TO DISCUSS LUNG CA SCREEN W LDCT  -     CT Lung Screen (Initial/Annual/Baseline); Future  2. Mixed hyperlipidemia       -  lipid panel today, prefers lifestyle changes to medication  3. Chest pain, unspecified type  -     CBC; Future  -     CT CARDIAC CALCIUM SCORING; Future  4. Atherosclerosis of abdominal aorta (HCC)       -  stable, continue with baby aspirin  5. HTN (hypertension), benign  -     atenolol (TENORMIN) 50 MG tablet; Take 1.5 tablets by mouth daily, Disp-135 tablet, R-1Normal  -   low blood pressure and pulse rate, will decrease atenolol to 75 mg  6. Hemorrhoids, unspecified hemorrhoid type  -     hydrocortisone (ANUSOL-HC) 25 MG suppository; Place 1 suppository rectally in the morning and 1 suppository in the evening. Do all this for 10 days., Disp-20 suppository, R-0Normal        -  if there is no improvement with suppositories I would recommend he follow up with Dr. Man        Subjective   SUBJECTIVE/OBJECTIVE:  HPI  The pt is here for evaluation of change in stool  Timing: one week  He first noticed some red streaks on the stool with darkening of the stool for 3-4 days and then lightened up but yesterday blood returned  Associated symptoms:Loss of appetite just yesterday, rectal tenderness  Denies: unintentional weight loss    Pt has been feeling very fatigued lately  Blood pressure is always low normal at home  Pulse rate is low today  He believes that the fatigue is related to the primidone we started for his tremor which has helped

## 2024-03-20 DIAGNOSIS — I10 HTN (HYPERTENSION), BENIGN: ICD-10-CM

## 2024-03-20 DIAGNOSIS — R07.9 CHEST PAIN, UNSPECIFIED TYPE: ICD-10-CM

## 2024-03-20 DIAGNOSIS — Z12.5 PROSTATE CANCER SCREENING: ICD-10-CM

## 2024-03-20 DIAGNOSIS — E78.2 MIXED HYPERLIPIDEMIA: ICD-10-CM

## 2024-03-20 LAB
ALBUMIN SERPL-MCNC: 4.7 G/DL (ref 3.4–5)
ALBUMIN/GLOB SERPL: 2 {RATIO} (ref 1.1–2.2)
ALP SERPL-CCNC: 77 U/L (ref 40–129)
ALT SERPL-CCNC: 25 U/L (ref 10–40)
ANION GAP SERPL CALCULATED.3IONS-SCNC: 13 MMOL/L (ref 3–16)
AST SERPL-CCNC: 17 U/L (ref 15–37)
BILIRUB SERPL-MCNC: 0.7 MG/DL (ref 0–1)
BUN SERPL-MCNC: 15 MG/DL (ref 7–20)
CALCIUM SERPL-MCNC: 10.1 MG/DL (ref 8.3–10.6)
CHLORIDE SERPL-SCNC: 100 MMOL/L (ref 99–110)
CHOLEST SERPL-MCNC: 208 MG/DL (ref 0–199)
CO2 SERPL-SCNC: 26 MMOL/L (ref 21–32)
CREAT SERPL-MCNC: 1.2 MG/DL (ref 0.8–1.3)
DEPRECATED RDW RBC AUTO: 14.3 % (ref 12.4–15.4)
GFR SERPLBLD CREATININE-BSD FMLA CKD-EPI: >60 ML/MIN/{1.73_M2}
GLUCOSE SERPL-MCNC: 101 MG/DL (ref 70–99)
HCT VFR BLD AUTO: 53.2 % (ref 40.5–52.5)
HDLC SERPL-MCNC: 40 MG/DL (ref 40–60)
HGB BLD-MCNC: 18.3 G/DL (ref 13.5–17.5)
LDLC SERPL CALC-MCNC: 126 MG/DL
MCH RBC QN AUTO: 30.3 PG (ref 26–34)
MCHC RBC AUTO-ENTMCNC: 34.5 G/DL (ref 31–36)
MCV RBC AUTO: 87.9 FL (ref 80–100)
PLATELET # BLD AUTO: 226 K/UL (ref 135–450)
PMV BLD AUTO: 9.2 FL (ref 5–10.5)
POTASSIUM SERPL-SCNC: 4.3 MMOL/L (ref 3.5–5.1)
PROT SERPL-MCNC: 7 G/DL (ref 6.4–8.2)
PSA SERPL DL<=0.01 NG/ML-MCNC: 0.87 NG/ML (ref 0–4)
RBC # BLD AUTO: 6.05 M/UL (ref 4.2–5.9)
SODIUM SERPL-SCNC: 139 MMOL/L (ref 136–145)
TRIGL SERPL-MCNC: 209 MG/DL (ref 0–150)
VLDLC SERPL CALC-MCNC: 42 MG/DL
WBC # BLD AUTO: 8 K/UL (ref 4–11)

## 2024-03-22 DIAGNOSIS — I70.0 ATHEROSCLEROSIS OF ABDOMINAL AORTA (HCC): ICD-10-CM

## 2024-03-22 DIAGNOSIS — E78.2 MIXED HYPERLIPIDEMIA: Primary | ICD-10-CM

## 2024-03-22 RX ORDER — ATORVASTATIN CALCIUM 40 MG/1
40 TABLET, FILM COATED ORAL DAILY
Qty: 90 TABLET | Refills: 0 | Status: SHIPPED | OUTPATIENT
Start: 2024-03-22

## 2024-03-27 ENCOUNTER — HOSPITAL ENCOUNTER (OUTPATIENT)
Dept: CT IMAGING | Age: 69
Discharge: HOME | End: 2024-03-27
Payer: MEDICARE

## 2024-03-27 DIAGNOSIS — Z87.891 PERSONAL HISTORY OF TOBACCO USE: ICD-10-CM

## 2024-03-27 DIAGNOSIS — R07.9 CHEST PAIN, UNSPECIFIED TYPE: ICD-10-CM

## 2024-03-27 PROCEDURE — 71271 CT THORAX LUNG CANCER SCR C-: CPT

## 2024-03-27 PROCEDURE — 75571 CT HRT W/O DYE W/CA TEST: CPT

## 2024-04-01 PROBLEM — J84.10 GRANULOMATOUS LUNG DISEASE (HCC): Status: ACTIVE | Noted: 2024-04-01

## 2024-04-01 PROBLEM — I25.83 CORONARY ARTERY DISEASE DUE TO LIPID RICH PLAQUE: Status: ACTIVE | Noted: 2024-04-01

## 2024-04-01 PROBLEM — I25.10 CORONARY ARTERY DISEASE DUE TO LIPID RICH PLAQUE: Status: ACTIVE | Noted: 2024-04-01

## 2024-04-17 DIAGNOSIS — G25.0 ESSENTIAL TREMOR: ICD-10-CM

## 2024-04-18 RX ORDER — PRIMIDONE 50 MG/1
100 TABLET ORAL DAILY
Qty: 180 TABLET | Refills: 0 | Status: SHIPPED | OUTPATIENT
Start: 2024-04-18 | End: 2024-06-07 | Stop reason: SDUPTHER

## 2024-04-18 NOTE — TELEPHONE ENCOUNTER
[facility] ambulatory encounter  URGENT CARE OFFICE VISIT    SUBJECTIVE:  Dani Drake is a 50 year old male        Chief Complaint   Patient presents with   • Asthma     Is having an asthma flare up and needs inhaler refilled.         Possible history of some asthma.  History of smoking.  Two weeks of shortness of breath chest tightness fatigue with cough.  That is getting worse    -no fever, no chills,   -no colds, no sore throat, no ear pain, no ear discharge  -no abd pain, no nausea, no vomiting, no diarrhea, no constipation, no bloody stools  -no dysuria, no hematuria        ASSESSMENT AND PLAN  Acute asthma  (primary encounter diagnosis)  Plan: albuterol 108 (90 Base) MCG/ACT inhaler,         azithromycin (ZITHROMAX) 500 MG tablet,         predniSONE (DELTASONE) 20 MG tablet      - Previous labs and results: gfr > 90.   - history was taken from: patient             The patient was advised to follow up with primary physician or to recheck with the urgent care clinic sooner if symptoms get worse or if new symptoms appear.    The patient indicated understanding of the diagnosis and agreed with the plan of care.    Please understand this is a provisional diagnosis that can and may change. The diagnosis that you are discharged with is based on the symptoms you described and the diagnostic information available today. If any new severe symptoms occur or existing symptoms worsen, you should seek evaluation at the nearest Emergency Department    ED precautions discussed with patient            Review of systems:    A review of systems was performed and findings relevant to these symptoms are included in the HPI.     PAST HISTORIES:    ALLERGIES:  No Known Allergies    MEDICATIONS:  Current Outpatient Medications   Medication Sig   • albuterol 108 (90 Base) MCG/ACT inhaler Inhale 2 puffs into the lungs every 4 hours as needed for Shortness of Breath or Wheezing.   • azithromycin (ZITHROMAX) 500 MG tablet Take 1 tablet by  Pt informed    mouth daily for 5 days.   • [START ON 10/30/2022] predniSONE (DELTASONE) 20 MG tablet Take 2 tablets by mouth daily for 5 days. Do not start before October 30, 2022.   • gabapentin (NEURONTIN) 300 MG capsule Take 300 mg by mouth at bedtime for 5 days then 300 mg twice daily for 5 days then 300 mg three times daily thereafter.   • lisinopril (ZESTRIL) 2.5 MG tablet Take 1 tablet by mouth daily.   • methylPREDNISolone (MEDROL, ELGIN,) 4 MG tablet Follow package directions   • albuterol 108 (90 Base) MCG/ACT inhaler Inhale 2 puffs into the lungs every 4 hours as needed for Shortness of Breath or Wheezing.   • buPROPion (Zyban) 150 MG 12 hr tablet 150mg po daily x 3 days, then take 150 mg PO BID x 7-12 weeks.  Stop smoking after 7 days of treatment.   • oxyCODONE, IMM REL, (ROXICODONE) 5 MG immediate release tablet Take 1 tablet by mouth every 4 hours as needed for Pain.   • tamsulosin (Flomax) 0.4 MG Cap Take 1 capsule by mouth daily after a meal.   • Misc Natural Products (GLUCOSAMINE CHOND CMP ADVANCED PO)      No current facility-administered medications for this visit.       Past Medical History:   Diagnosis Date   • Arthritis    • Painful orthopaedic hardware (CMS/HCC)    • RAD (reactive airway disease)          OBJECTIVE:  Physical Exam:    Visit Vitals  BP (!) 177/112   Pulse 87   Temp 96.8 °F (36 °C) (Temporal)   Resp 20   Ht 6' 1\" (1.854 m)   Wt 107.2 kg (236 lb 7.1 oz)   SpO2 94%   BMI 31.19 kg/m²        CONSTITUTIONAL: Well-hydrated, well-nourished male who appears to be in no acute distress. Awake, alert and cooperative.  HEENT:  External ears without deformities. Hearing is grossly normal. Nose without deformities. There is moist nasal mucosa. Throat is clear with moist mucous membranes.   NECK: No lymphadenopathy (check) or thyroid enlargement. There is full range of motion. There is no tenderness noted.  LUNGS:  Severe wheezing.  Positive rhonchi  CARDIOVASCULAR: Regular rate and rhythm with normal S1 and  S2. There are no murmurs auscultated.         Ulysses Boco MD  [facility] - Urgent [facility]

## 2024-04-18 NOTE — TELEPHONE ENCOUNTER
Refill Request     CONFIRM preferred pharmacy with the patient.    If Mail Order Rx - Pend for 90 day refill.      Last Seen: Last Seen Department: 3/19/2024  Last Seen by PCP: 3/19/2024    Last Written: 3/18/24 30 with no refills     If no future appointment scheduled:  Review the last OV with PCP and review information for follow-up visit,  Route STAFF MESSAGE with patient name to the  Pool for scheduling with the following information:            -  Timing of next visit           -  Visit type ie Physical, OV, etc           -  Diagnoses/Reason ie. COPD, HTN - Do not use MEDICATION, Follow-up or CHECK UP - Give reason for visit      Next Appointment:   Future Appointments   Date Time Provider Department Center   6/7/2024  8:30 AM Junie Grace PA [facility]  [place] - [place]       Message sent to  to schedule appt with patient?  NO      Requested Prescriptions     Pending Prescriptions Disp Refills    primidone (MYSOLINE) 50 MG tablet 30 tablet 0     Sig: Take 1 tablet by mouth daily

## 2024-04-18 NOTE — TELEPHONE ENCOUNTER
Please let him know that I doubled this medication to 100 mg. He will need to take two 50 mg tablets together in the AM. Let me know if this is ineffective. Thank you

## 2024-04-30 ENCOUNTER — TELEMEDICINE (OUTPATIENT)
Age: 69
End: 2024-04-30
Payer: COMMERCIAL

## 2024-04-30 ENCOUNTER — PATIENT MESSAGE (OUTPATIENT)
Dept: FAMILY MEDICINE CLINIC | Age: 69
End: 2024-04-30

## 2024-04-30 DIAGNOSIS — U07.1 COVID-19: Primary | ICD-10-CM

## 2024-04-30 PROCEDURE — 1036F TOBACCO NON-USER: CPT | Performed by: NURSE PRACTITIONER

## 2024-04-30 PROCEDURE — G8427 DOCREV CUR MEDS BY ELIG CLIN: HCPCS | Performed by: NURSE PRACTITIONER

## 2024-04-30 PROCEDURE — 3017F COLORECTAL CA SCREEN DOC REV: CPT | Performed by: NURSE PRACTITIONER

## 2024-04-30 PROCEDURE — 1123F ACP DISCUSS/DSCN MKR DOCD: CPT | Performed by: NURSE PRACTITIONER

## 2024-04-30 PROCEDURE — 99213 OFFICE O/P EST LOW 20 MIN: CPT | Performed by: NURSE PRACTITIONER

## 2024-04-30 PROCEDURE — G8417 CALC BMI ABV UP PARAM F/U: HCPCS | Performed by: NURSE PRACTITIONER

## 2024-04-30 RX ORDER — BENZONATATE 100 MG/1
100-200 CAPSULE ORAL 3 TIMES DAILY PRN
Qty: 40 CAPSULE | Refills: 0 | Status: SHIPPED | OUTPATIENT
Start: 2024-04-30 | End: 2024-05-07

## 2024-04-30 ASSESSMENT — ENCOUNTER SYMPTOMS
NAUSEA: 0
WHEEZING: 0
VOMITING: 0
CHEST TIGHTNESS: 0
SHORTNESS OF BREATH: 0
SINUS PRESSURE: 1
COUGH: 1
TROUBLE SWALLOWING: 0
SORE THROAT: 0
DIARRHEA: 0

## 2024-04-30 NOTE — PROGRESS NOTES
[facility] Primary Care Virtualist Encounter Note       Chief Complaint   Patient presents with    Positive For Covid-19     Symptoms started  evening, pt's wife had COVID last week. Positive test on Monday. Pt c/o fever (103), body aches/joint pain, sinus drainage, sneezing, and a dry cough.       HPI:    Jayro Mckoy (:  1955) has requested an audio/video evaluation for the following concern(s):    This is an established patient of JEAN-PAUL Carroll. This is the first time I am seeing the patient today. He requested this visit for + COVID. Symptoms began 2024. Tested positive 24. Wife had COVID last week. Symptoms include: Fatigue, fever highest 103, chills, body aches, cough, PND, and sneezing.    Review of Systems   Constitutional:  Positive for chills, fatigue and fever.   HENT:  Positive for congestion, postnasal drip and sinus pressure. Negative for sore throat and trouble swallowing.    Respiratory:  Positive for cough. Negative for chest tightness, shortness of breath and wheezing.    Cardiovascular: Negative.    Gastrointestinal:  Negative for diarrhea, nausea and vomiting.   Genitourinary: Negative.    Musculoskeletal:  Positive for myalgias.       Prior to Visit Medications    Medication Sig Taking? Authorizing Provider   molnupiravir 200 MG capsule Take 4 capsules by mouth in the morning and 4 capsules in the evening. Do all this for 5 days. Yes Vero Summers APRN - CNP   benzonatate (TESSALON) 100 MG capsule Take 1-2 capsules by mouth 3 times daily as needed for Cough Yes Vero Summers APRN - CNP   primidone (MYSOLINE) 50 MG tablet Take 2 tablets by mouth daily Yes Junie Grace PA   atorvastatin (LIPITOR) 40 MG tablet Take 1 tablet by mouth daily Yes Junie Grace PA   atenolol (TENORMIN) 50 MG tablet Take 1.5 tablets by mouth daily Yes Junie Grace PA   fluticasone (FLONASE) 50 MCG/ACT nasal spray 1 spray by Each Nostril route

## 2024-04-30 NOTE — TELEPHONE ENCOUNTER
From: Jayro Mckoy  To: Junie Grace  Sent: 4/30/2024 6:03 AM EDT  Subject: Work note - Covid    I tested positive for Covid yesterday morning. Last night I had a fever of 103, this morning it is down to 100. Can you send Paxlovid or is it too late to start. Also, My work requires a note if I miss more than 2 days. Would you be able to send me a work note, to cover the rest of this week or do I need to come in for an appointment. Thank you

## 2024-04-30 NOTE — PATIENT INSTRUCTIONS
Your recent COVID test was positive.     You are a candidate for Molnupiravir, as Paxlovid coadministration issues with some of your scheduled medications. Allergies, Medications, and labs reviewed.    Currently, the CDC recommends staying at home in self isolation for at least 5 days. After that, if you are without a fever and symptoms are improving, you may go out, but only if wearing a mask for an additional 5 days.     Thankfully, most people recover uneventfully. The mainstay of treatment for most people remains focused on symptom control, isolating and watching for signs of worsening illness. You should drink plenty of fluids, eat when you are able, and rest as much as possible.    Recommend treating symptoms with OTC medications: Flonase for congestions, Mucinex for Phlegm, Robitussin DM or Delsym for cough, Tylenol/Ibuprofen for fever/body aches. Cepacol lozenges and saltwater gargles for sore throat.      Recommend Vitamin D, C and Zinc.    We do not prescribe antibiotics for viral illnesses; however, we can treat secondary infections should the need arise.    Please seek more urgent medical attention if you develop any of the following:  Chest pain  Shortness of breath  Bluish lips or face  Severe headache   Severe dizziness or passing out  New confusion  Inability to stay awake  Extreme weakness    If you have a pulse oximeter, check it at least daily. Seek urgent medical attention if it drops to 92% or below.

## 2024-04-30 NOTE — TELEPHONE ENCOUNTER
Appointment scheduled with virtualist today.  Patient aware they will discuss treatment options and work excuse at time of visit.  Patient stated understanding.

## 2024-05-02 DIAGNOSIS — I10 HTN (HYPERTENSION), BENIGN: ICD-10-CM

## 2024-05-03 RX ORDER — HYDROCHLOROTHIAZIDE 25 MG/1
25 TABLET ORAL EVERY MORNING
Qty: 90 TABLET | Refills: 1 | Status: SHIPPED | OUTPATIENT
Start: 2024-05-03

## 2024-06-06 SDOH — HEALTH STABILITY: PHYSICAL HEALTH: ON AVERAGE, HOW MANY DAYS PER WEEK DO YOU ENGAGE IN MODERATE TO STRENUOUS EXERCISE (LIKE A BRISK WALK)?: 7 DAYS

## 2024-06-06 SDOH — HEALTH STABILITY: PHYSICAL HEALTH: ON AVERAGE, HOW MANY MINUTES DO YOU ENGAGE IN EXERCISE AT THIS LEVEL?: 150+ MIN

## 2024-06-06 ASSESSMENT — PATIENT HEALTH QUESTIONNAIRE - PHQ9
SUM OF ALL RESPONSES TO PHQ QUESTIONS 1-9: 0
2. FEELING DOWN, DEPRESSED OR HOPELESS: NOT AT ALL
SUM OF ALL RESPONSES TO PHQ QUESTIONS 1-9: 0
1. LITTLE INTEREST OR PLEASURE IN DOING THINGS: NOT AT ALL
SUM OF ALL RESPONSES TO PHQ9 QUESTIONS 1 & 2: 0
SUM OF ALL RESPONSES TO PHQ QUESTIONS 1-9: 0
SUM OF ALL RESPONSES TO PHQ QUESTIONS 1-9: 0

## 2024-06-06 ASSESSMENT — LIFESTYLE VARIABLES
HOW OFTEN DO YOU HAVE SIX OR MORE DRINKS ON ONE OCCASION: 1
HOW MANY STANDARD DRINKS CONTAINING ALCOHOL DO YOU HAVE ON A TYPICAL DAY: 0
HOW OFTEN DO YOU HAVE A DRINK CONTAINING ALCOHOL: NEVER
HOW OFTEN DO YOU HAVE A DRINK CONTAINING ALCOHOL: 1
HOW MANY STANDARD DRINKS CONTAINING ALCOHOL DO YOU HAVE ON A TYPICAL DAY: PATIENT DOES NOT DRINK

## 2024-06-07 ENCOUNTER — TELEPHONE (OUTPATIENT)
Dept: FAMILY MEDICINE CLINIC | Age: 69
End: 2024-06-07

## 2024-06-07 ENCOUNTER — OFFICE VISIT (OUTPATIENT)
Dept: FAMILY MEDICINE CLINIC | Age: 69
End: 2024-06-07
Payer: MEDICARE

## 2024-06-07 VITALS
HEIGHT: 72 IN | WEIGHT: 207 LBS | DIASTOLIC BLOOD PRESSURE: 72 MMHG | OXYGEN SATURATION: 97 % | HEART RATE: 57 BPM | BODY MASS INDEX: 28.04 KG/M2 | SYSTOLIC BLOOD PRESSURE: 104 MMHG

## 2024-06-07 DIAGNOSIS — G25.0 ESSENTIAL TREMOR: ICD-10-CM

## 2024-06-07 DIAGNOSIS — Z00.00 INITIAL MEDICARE ANNUAL WELLNESS VISIT: Primary | ICD-10-CM

## 2024-06-07 DIAGNOSIS — E78.2 MIXED HYPERLIPIDEMIA: ICD-10-CM

## 2024-06-07 DIAGNOSIS — I10 HTN (HYPERTENSION), BENIGN: ICD-10-CM

## 2024-06-07 PROCEDURE — 99214 OFFICE O/P EST MOD 30 MIN: CPT | Performed by: PHYSICIAN ASSISTANT

## 2024-06-07 PROCEDURE — G8427 DOCREV CUR MEDS BY ELIG CLIN: HCPCS | Performed by: PHYSICIAN ASSISTANT

## 2024-06-07 PROCEDURE — 3017F COLORECTAL CA SCREEN DOC REV: CPT | Performed by: PHYSICIAN ASSISTANT

## 2024-06-07 PROCEDURE — 3074F SYST BP LT 130 MM HG: CPT | Performed by: PHYSICIAN ASSISTANT

## 2024-06-07 PROCEDURE — 3078F DIAST BP <80 MM HG: CPT | Performed by: PHYSICIAN ASSISTANT

## 2024-06-07 PROCEDURE — G8417 CALC BMI ABV UP PARAM F/U: HCPCS | Performed by: PHYSICIAN ASSISTANT

## 2024-06-07 PROCEDURE — G0438 PPPS, INITIAL VISIT: HCPCS | Performed by: PHYSICIAN ASSISTANT

## 2024-06-07 PROCEDURE — 1123F ACP DISCUSS/DSCN MKR DOCD: CPT | Performed by: PHYSICIAN ASSISTANT

## 2024-06-07 PROCEDURE — 1036F TOBACCO NON-USER: CPT | Performed by: PHYSICIAN ASSISTANT

## 2024-06-07 RX ORDER — PRIMIDONE 50 MG/1
125 TABLET ORAL DAILY
Qty: 225 TABLET | Refills: 1 | Status: SHIPPED | OUTPATIENT
Start: 2024-06-07 | End: 2024-12-04

## 2024-06-07 RX ORDER — PRIMIDONE 125 MG/1
125 TABLET ORAL DAILY
Qty: 90 TABLET | Refills: 1 | Status: SHIPPED | OUTPATIENT
Start: 2024-06-07 | End: 2024-06-07

## 2024-06-07 RX ORDER — EZETIMIBE 10 MG/1
10 TABLET ORAL DAILY
Qty: 90 TABLET | Refills: 1 | Status: SHIPPED | OUTPATIENT
Start: 2024-06-07

## 2024-06-07 ASSESSMENT — ENCOUNTER SYMPTOMS
WHEEZING: 0
SHORTNESS OF BREATH: 0

## 2024-06-07 ASSESSMENT — PATIENT HEALTH QUESTIONNAIRE - PHQ9
SUM OF ALL RESPONSES TO PHQ QUESTIONS 1-9: 0
7. TROUBLE CONCENTRATING ON THINGS, SUCH AS READING THE NEWSPAPER OR WATCHING TELEVISION: NOT AT ALL
8. MOVING OR SPEAKING SO SLOWLY THAT OTHER PEOPLE COULD HAVE NOTICED. OR THE OPPOSITE, BEING SO FIGETY OR RESTLESS THAT YOU HAVE BEEN MOVING AROUND A LOT MORE THAN USUAL: NOT AT ALL
3. TROUBLE FALLING OR STAYING ASLEEP: NOT AT ALL
9. THOUGHTS THAT YOU WOULD BE BETTER OFF DEAD, OR OF HURTING YOURSELF: NOT AT ALL
2. FEELING DOWN, DEPRESSED OR HOPELESS: NOT AT ALL
SUM OF ALL RESPONSES TO PHQ QUESTIONS 1-9: 0
6. FEELING BAD ABOUT YOURSELF - OR THAT YOU ARE A FAILURE OR HAVE LET YOURSELF OR YOUR FAMILY DOWN: NOT AT ALL
SUM OF ALL RESPONSES TO PHQ9 QUESTIONS 1 & 2: 0
SUM OF ALL RESPONSES TO PHQ QUESTIONS 1-9: 0
10. IF YOU CHECKED OFF ANY PROBLEMS, HOW DIFFICULT HAVE THESE PROBLEMS MADE IT FOR YOU TO DO YOUR WORK, TAKE CARE OF THINGS AT HOME, OR GET ALONG WITH OTHER PEOPLE: NOT DIFFICULT AT ALL
DEPRESSION UNABLE TO ASSESS: FUNCTIONAL CAPACITY MOTIVATION LIMITS ACCURACY
5. POOR APPETITE OR OVEREATING: NOT AT ALL
4. FEELING TIRED OR HAVING LITTLE ENERGY: NOT AT ALL
1. LITTLE INTEREST OR PLEASURE IN DOING THINGS: NOT AT ALL
SUM OF ALL RESPONSES TO PHQ QUESTIONS 1-9: 0

## 2024-06-07 NOTE — PROGRESS NOTES
Medicare Annual Wellness Visit    Jayro Mckoy is here for Medicare AWV    Assessment & Plan   Initial Medicare annual wellness visit      -  discussed immunizations for RSV, all questions answered. Paperwork provided for ACP.    Recommendations for Preventive Services Due: see orders and patient instructions/AVS.  Recommended screening schedule for the next 5-10 years is provided to the patient in written form: see Patient Instructions/AVS.     Return in about 6 months (around 12/7/2024) for HTN.     Subjective       Patient's complete Health Risk Assessment and screening values have been reviewed and are found in Flowsheets. The following problems were reviewed today and where indicated follow up appointments were made and/or referrals ordered.    Positive Risk Factor Screenings with Interventions:        Depression:  Depression Unable to Assess: Functional capacity motivation limits accuracy  PHQ-2 Score: 0  PHQ-9 Total Score: 0    Interpretation:  5-9 mild   10-14 moderate   15-19 moderately severe   20-27 severe            Activity, Diet, and Weight:  On average, how many days per week do you engage in moderate to strenuous exercise (like a brisk walk)?: 7 days  On average, how many minutes do you engage in exercise at this level?: 150+ min    Do you eat balanced/healthy meals regularly?: (!) No    Body mass index is 28.07 kg/m².    Do you eat balanced/healthy meals regularly Interventions:  Increase fiber in diet to 1/2 of your plate at meal time          Vision Screen:  Do you have difficulty driving, watching TV, or doing any of your daily activities because of your eyesight?: No  Have you had an eye exam within the past year?: (!) No  No results found.    Interventions:   Patient encouraged to make appointment with their eye specialist    Safety:  Do you have non-slip mats or non-slip surfaces or shower bars or grab bars in your shower or bathtub?: (!) No  Interventions:  Recommended purchasing a non-slip

## 2024-06-07 NOTE — PROGRESS NOTES
Jayro Mckoy (:  1955) is a 68 y.o. male,Established patient, here for evaluation of the following chief complaint(s):  Medicare AWV      Assessment & Plan   1. HTN (hypertension), benign       -  hypotension, reduce atenolol to 1/2 tablet daily, follow up in two weeks with blood pressure readings, follow up via mychart in two weeks, in office in 6 months. Continue hctz  2. Essential tremor  -     primidone 125 MG TABS; Take 125 mg by mouth daily, Disp-90 tablet, R-1Please do not fill until the patient callsNormal  -   improved but not at goal, will increase primidone to 125 mg, follow up in 6 months  3. Mixed hyperlipidemia  -     ezetimibe (ZETIA) 10 MG tablet; Take 1 tablet by mouth daily, Disp-90 tablet, R-1Normal        -   unable to tolerate statins, will trial zetia, lab work in 6 months      Subjective   HPI  Essential tremor  Current medication: primidone  Side effects: possibly lower blood pressure  Reports that he has improvement in tremor and is now able to carry things with his left hand however, the tremor is still bothersome and he wonders if we can increase the dose    Blood pressure  Low normal  Occasionally feels fatigued and lightheaded at home  Is only taking one tablet of atenolol  Denies any chest pain or swelling  Checking blood pressure and getting low readings    Hyperlipidemia  Reports that he cannot tolerate statin medication  Open to other medications    Review of Systems   Constitutional:  Negative for fatigue and unexpected weight change.   Respiratory:  Negative for shortness of breath and wheezing.    Cardiovascular:  Negative for chest pain, palpitations and leg swelling.   Neurological:  Positive for dizziness, tremors and light-headedness. Negative for headaches.          Objective   Physical Exam  Vitals reviewed.   Constitutional:       Appearance: Normal appearance. He is normal weight.   HENT:      Head: Normocephalic and atraumatic.   Cardiovascular:      Rate

## 2024-06-07 NOTE — TELEPHONE ENCOUNTER
[facility] called the office stating that Primidone is only available in 50mg or 250mg. Please advise, thanks.

## 2024-08-30 ENCOUNTER — OFFICE VISIT (OUTPATIENT)
Dept: FAMILY MEDICINE CLINIC | Age: 69
End: 2024-08-30
Payer: MEDICARE

## 2024-08-30 VITALS
OXYGEN SATURATION: 96 % | BODY MASS INDEX: 28.75 KG/M2 | DIASTOLIC BLOOD PRESSURE: 64 MMHG | SYSTOLIC BLOOD PRESSURE: 122 MMHG | WEIGHT: 212 LBS | HEART RATE: 61 BPM

## 2024-08-30 DIAGNOSIS — M16.12 ARTHRITIS OF LEFT HIP: ICD-10-CM

## 2024-08-30 DIAGNOSIS — G25.0 ESSENTIAL TREMOR: Primary | ICD-10-CM

## 2024-08-30 DIAGNOSIS — M76.32 ILIOTIBIAL BAND SYNDROME OF LEFT SIDE: ICD-10-CM

## 2024-08-30 PROCEDURE — 3017F COLORECTAL CA SCREEN DOC REV: CPT | Performed by: PHYSICIAN ASSISTANT

## 2024-08-30 PROCEDURE — 3078F DIAST BP <80 MM HG: CPT | Performed by: PHYSICIAN ASSISTANT

## 2024-08-30 PROCEDURE — 3074F SYST BP LT 130 MM HG: CPT | Performed by: PHYSICIAN ASSISTANT

## 2024-08-30 PROCEDURE — 1123F ACP DISCUSS/DSCN MKR DOCD: CPT | Performed by: PHYSICIAN ASSISTANT

## 2024-08-30 PROCEDURE — 1036F TOBACCO NON-USER: CPT | Performed by: PHYSICIAN ASSISTANT

## 2024-08-30 PROCEDURE — 99213 OFFICE O/P EST LOW 20 MIN: CPT | Performed by: PHYSICIAN ASSISTANT

## 2024-08-30 PROCEDURE — G8417 CALC BMI ABV UP PARAM F/U: HCPCS | Performed by: PHYSICIAN ASSISTANT

## 2024-08-30 PROCEDURE — G8427 DOCREV CUR MEDS BY ELIG CLIN: HCPCS | Performed by: PHYSICIAN ASSISTANT

## 2024-08-30 ASSESSMENT — ENCOUNTER SYMPTOMS
COLOR CHANGE: 0
BACK PAIN: 0

## 2024-08-30 NOTE — ASSESSMENT & PLAN NOTE
Continue with primidone, will establish with neurology    Orders:    Milton Sotelo MD, Neurology, [facility]

## 2024-08-30 NOTE — PROGRESS NOTES
Jayro Mckoy (:  1955) is a 69 y.o. male,Established patient, here for evaluation of the following chief complaint(s):  Hip Pain (Left hip, x's 2 weeks, no known injury, states the pain is getting worse every day ) and Referral - General (Neuro referral for his tremors )         Assessment & Plan  Essential tremor    Continue with primidone, will establish with neurology    Orders:    Milton Sotelo MD, Neurology, [facility]    Arthritis of left hip    Trial stretches and volarten for a few weeks, if no improvement in symptoms or if symptoms worsen before then we will get an xray of the hip    Orders:    diclofenac sodium (VOLTAREN) 1 % GEL; Apply 4 g topically 4 times daily    Iliotibial band syndrome of left side    Mild, exercises provided today. 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 Negative for back pain, gait problem, joint swelling and myalgias.   Skin:  Negative for color change.   Neurological:  Positive for tremors. Negative for weakness and numbness.          Objective   Physical Exam  Vitals reviewed.   Constitutional:       Appearance: Normal appearance. He is normal weight.   Musculoskeletal:      Left hip: Tenderness and bony tenderness present. No deformity or lacerations. Normal range of motion. Normal strength.      Left upper leg: Tenderness present.      Comments: Tenderness in groin and along ITB   Neurological:      Mental Status: He is alert.      Gait: Gait normal.                   An electronic signature was used to authenticate this note.    --Junie Grace, PA

## 2024-11-05 DIAGNOSIS — I10 HTN (HYPERTENSION), BENIGN: ICD-10-CM

## 2024-11-05 RX ORDER — HYDROCHLOROTHIAZIDE 25 MG/1
25 TABLET ORAL EVERY MORNING
Qty: 90 TABLET | Refills: 0 | Status: SHIPPED | OUTPATIENT
Start: 2024-11-05

## 2024-11-05 NOTE — TELEPHONE ENCOUNTER
Refill Request     CONFIRM preferred pharmacy with the patient.    If Mail Order Rx - Pend for 90 day refill.      Last Seen: Last Seen Department: 8/30/2024  Last Seen by PCP: Visit date not found    Last Written: 5/3/24    If no future appointment scheduled:  Review the last OV with PCP and review information for follow-up visit,  Route STAFF MESSAGE with patient name to the  Pool for scheduling with the following information:            -  Timing of next visit           -  Visit type ie Physical, OV, etc           -  Diagnoses/Reason ie. COPD, HTN - Do not use MEDICATION, Follow-up or CHECK UP - Give reason for visit      Next Appointment:   Future Appointments   Date Time Provider Department Center   11/11/2024 11:15 AM Milton Scott MD AND [facility]O Neurology -       Message sent to  to schedule appt with patient?  NO      Requested Prescriptions     Pending Prescriptions Disp Refills    hydroCHLOROthiazide (HYDRODIURIL) 25 MG tablet [Pharmacy Med Name: hydroCHLOROthiazide Oral Tablet 25 MG] 90 tablet 0     Sig: TAKE 1 TABLET BY MOUTH IN THE MORNING

## 2024-11-11 ENCOUNTER — OFFICE VISIT (OUTPATIENT)
Dept: NEUROLOGY | Age: 69
End: 2024-11-11

## 2024-11-11 VITALS
HEART RATE: 67 BPM | WEIGHT: 203 LBS | OXYGEN SATURATION: 97 % | HEIGHT: 72 IN | SYSTOLIC BLOOD PRESSURE: 100 MMHG | DIASTOLIC BLOOD PRESSURE: 68 MMHG | BODY MASS INDEX: 27.5 KG/M2

## 2024-11-11 DIAGNOSIS — G25.0 ESSENTIAL TREMOR: Primary | ICD-10-CM

## 2024-11-11 RX ORDER — PRIMIDONE 50 MG/1
TABLET ORAL
Qty: 180 TABLET | Refills: 1 | Status: SHIPPED | OUTPATIENT
Start: 2024-11-11 | End: 2025-05-10

## 2024-11-11 RX ORDER — ASPIRIN 81 MG/1
81 TABLET, CHEWABLE ORAL DAILY
COMMUNITY
Start: 2024-11-10

## 2024-11-11 NOTE — PROGRESS NOTES
Neurology outpatient new visit    Patient name: Jayro Mckoy      Chief Complaint:  Tremor    History of present illness:  This is a 69 years old right-handed male.  The patient is here for evaluation of tremor.  The patient has had tremor for more than 20 years but it has been getting worse recently.  The patient also reports significant impact from his tremor.  The tremor is more predominant on the left hand.  The patient had been on primidone since this year.  Initially, the patient had significant benefit from primidone.  Unfortunately, the benefit from previous on has been subsided over time.  The patient is currently on primidone at 150 mg a day.  The patient denies significant side effect from primidone.  The patient never had any brain scan done in the past.    Past medical history:    Past Medical History:   Diagnosis Date    Baker's cyst of knee, right     Diverticulosis     Hypertension     Nocturia     Tobacco use        Past surgical history:    Past Surgical History:   Procedure Laterality Date    COLONOSCOPY  9-    3 polyps    TONSILLECTOMY          Medication:    Current Outpatient Medications   Medication Sig Dispense Refill    aspirin 81 MG chewable tablet Take 1 tablet by mouth daily      primidone (MYSOLINE) 50 MG tablet Take 1 tablet by mouth every morning AND 1 tablet at bedtime. 180 tablet 1    hydroCHLOROthiazide (HYDRODIURIL) 25 MG tablet TAKE 1 TABLET BY MOUTH IN THE MORNING 90 tablet 0    atenolol (TENORMIN) 50 MG tablet Take 1.5 tablets by mouth daily (Patient taking differently: Take 0.5 tablets by mouth daily) 135 tablet 1     No current facility-administered medications for this visit.       Allergies:    Allergies as of 11/11/2024 - Fully Reviewed 11/11/2024   Allergen Reaction Noted    Adhesive tape Dermatitis 03/09/2021    Atorvastatin Nausea Only 06/07/2024        Social history:     reports that he quit smoking about 2 years ago. His smoking use included cigarettes. He

## 2024-11-11 NOTE — PATIENT INSTRUCTIONS
YOU MUST CONFIRM YOUR APPOINTMENT 1 DAY PRIOR OR IT WILL BE CANCELLED!!   Our office will call you 3 times the day prior to your appointment in an attempt to confirm.  Please return our call ASAP or confirm your appt through [employer] [facility] [facility] no later than 3 pm the day before your appointment.  If we do not hear back from you by 3 pm to confirm, your appointment will be cancelled & someone will be added into that slot from our wait list.

## 2024-12-02 ENCOUNTER — HOSPITAL ENCOUNTER (OUTPATIENT)
Dept: MRI IMAGING | Age: 69
Discharge: HOME | End: 2024-12-02
Attending: PSYCHIATRY & NEUROLOGY
Payer: MEDICARE

## 2024-12-02 DIAGNOSIS — G25.0 ESSENTIAL TREMOR: ICD-10-CM

## 2024-12-02 PROCEDURE — 70551 MRI BRAIN STEM W/O DYE: CPT

## 2025-02-03 DIAGNOSIS — I10 HTN (HYPERTENSION), BENIGN: ICD-10-CM

## 2025-02-03 RX ORDER — HYDROCHLOROTHIAZIDE 25 MG/1
25 TABLET ORAL EVERY MORNING
Qty: 90 TABLET | Refills: 1 | Status: SHIPPED | OUTPATIENT
Start: 2025-02-03

## 2025-02-03 NOTE — TELEPHONE ENCOUNTER
Refill Request     CONFIRM preferred pharmacy with the patient.    If Mail Order Rx - Pend for 90 day refill.      Last Seen: Last Seen Department: 8/30/2024  Last Seen by PCP: Visit date not found    Last Written: 11/05/2024 90 tab 0 refills     If no future appointment scheduled:  Review the last OV with PCP and review information for follow-up visit,  Route STAFF MESSAGE with patient name to the  Pool for scheduling with the following information:            -  Timing of next visit           -  Visit type ie Physical, OV, etc           -  Diagnoses/Reason ie. COPD, HTN - Do not use MEDICATION, Follow-up or CHECK UP - Give reason for visit      Next Appointment:   Future Appointments   Date Time Provider Department Center   2/4/2025  9:00 AM Milton Scott MD AND [facility] Neurology -       Message sent to  to schedule appt with patient?  NO      Requested Prescriptions     Pending Prescriptions Disp Refills    hydroCHLOROthiazide (HYDRODIURIL) 25 MG tablet [Pharmacy Med Name: hydroCHLOROthiazide Oral Tablet 25 MG] 90 tablet 0     Sig: TAKE 1 TABLET BY MOUTH IN THE MORNING

## 2025-02-04 ENCOUNTER — TELEPHONE (OUTPATIENT)
Age: 70
End: 2025-02-04

## 2025-02-04 ENCOUNTER — OFFICE VISIT (OUTPATIENT)
Dept: NEUROLOGY | Age: 70
End: 2025-02-04
Payer: COMMERCIAL

## 2025-02-04 ENCOUNTER — TELEPHONE (OUTPATIENT)
Dept: ADMINISTRATIVE | Age: 70
End: 2025-02-04

## 2025-02-04 VITALS
HEIGHT: 72 IN | BODY MASS INDEX: 27.5 KG/M2 | DIASTOLIC BLOOD PRESSURE: 70 MMHG | WEIGHT: 203 LBS | OXYGEN SATURATION: 95 % | HEART RATE: 74 BPM | SYSTOLIC BLOOD PRESSURE: 116 MMHG

## 2025-02-04 DIAGNOSIS — G25.0 ESSENTIAL TREMOR: Primary | ICD-10-CM

## 2025-02-04 DIAGNOSIS — R93.0 ABNORMAL MRI OF THE HEAD: ICD-10-CM

## 2025-02-04 PROCEDURE — 3078F DIAST BP <80 MM HG: CPT | Performed by: PSYCHIATRY & NEUROLOGY

## 2025-02-04 PROCEDURE — 1123F ACP DISCUSS/DSCN MKR DOCD: CPT | Performed by: PSYCHIATRY & NEUROLOGY

## 2025-02-04 PROCEDURE — 3074F SYST BP LT 130 MM HG: CPT | Performed by: PSYCHIATRY & NEUROLOGY

## 2025-02-04 PROCEDURE — 99214 OFFICE O/P EST MOD 30 MIN: CPT | Performed by: PSYCHIATRY & NEUROLOGY

## 2025-02-04 RX ORDER — PRIMIDONE 50 MG/1
TABLET ORAL
Qty: 90 TABLET | Refills: 2 | Status: SHIPPED | OUTPATIENT
Start: 2025-02-04

## 2025-02-04 NOTE — PROGRESS NOTES
Neurology outpatient F/U visit    Patient name: Jayro Mckoy      Chief Complaint:  Tremor    History of present illness:  This is a 69 years old right-handed male.  The patient is here for evaluation of tremor.  The patient has had tremor for more than 20 years but it has been getting worse recently.  The patient also reports significant impact from his tremor.  The tremor is more predominant on the left hand.  The patient had been on primidone since this year.  Initially, the patient had significant benefit from primidone.  Unfortunately, the benefit from previous on has been subsided over time.  The patient is currently on primidone at 150 mg a day.  The patient denies significant side effect from primidone.  The patient never had any brain scan done in the past.    Interval History:  02/04/25: The patient is here for follow-up tremor.  The patient also had MRI brain done prior to this visit.  MRI brain showed generalized brain atrophy.  The patient remains to have significant tremor especially on the left side.  The patient denies significant benefit after higher dose of primidone.  The patient also denies significant side effect from primidone.    Past medical history:    Past Medical History:   Diagnosis Date    Baker's cyst of knee, right     Diverticulosis     Hypertension     Nocturia     Tobacco use        Past surgical history:    Past Surgical History:   Procedure Laterality Date    COLONOSCOPY  9-    3 polyps    TONSILLECTOMY          Medication:    Current Outpatient Medications   Medication Sig Dispense Refill    primidone (MYSOLINE) 50 MG tablet Take 1 tablet by mouth every morning AND 2 tablets nightly. Taking 1 tab in a.m. and 2 in p.m.. 90 tablet 2    perampanel (FYCOMPA) 2 MG TABS tablet Take 1 tablet by mouth nightly for 30 days, THEN 2 tablets nightly for 60 days. Max Daily Amount: 4 mg. 150 tablet 0    hydroCHLOROthiazide (HYDRODIURIL) 25 MG tablet TAKE 1 TABLET BY MOUTH IN THE

## 2025-02-04 NOTE — TELEPHONE ENCOUNTER
Submitted PA for Fycompa [facility] Key: [id number] STATUS: PENDING.    Follow up done daily; if no decision with in three days we will refax.  If another three days goes by with no decision will call the insurance for status.

## 2025-02-04 NOTE — TELEPHONE ENCOUNTER
The preferred alternatives to perampanel include the following:    GABAPENTIN, LACOSAMIDE, LAMOTRIGINE, OXCARBAZEPINE,TOPIRAMATE, ZONISAMIDE.      Per M, examples include lacosamide (Vimpat tablets or oral solution), topiramate (Topamax, generics), lamotrigine (Lamictal, generics), gabapentin (Neurontin, generics), zonisamide (Zonegran, generics), pregabalin (Lyrica, generics), oxcarbazepine (Trileptal, generics), levetiracetam (Keppra, Keppra XR generics), divalproex sodium (Depakote, Depakote ER, generics), Spritam, carbamazepine (Tegretol, Tegretol XR, generics), Briviact, Qudexy XR, TrokendiXR, Oxtellar XR.

## 2025-02-04 NOTE — TELEPHONE ENCOUNTER
The medication was DENIED; DENIAL letter is uploaded to [employer].    Generic Denial: Patient must complete step therapy, Unless there is a contraindication that you can provide.     If this requires a response please respond to the pool ( P MHCX PSC MEDICATION PRE-AUTH).      Thank you please advise patient.

## 2025-02-04 NOTE — TELEPHONE ENCOUNTER
Submitted PA for Fycompa Via [facility] Key: [id number] STATUS: NOT SENT    Has the patient tried THREE Antiepileptic agents?      The preferred alternatives include the following:    GABAPENTIN, LACOSAMIDE, LAMOTRIGINE, OXCARBAZEPINE,TOPIRAMATE, ZONISAMIDE.     Per [facility], examples include lacosamide (Vimpat tablets or oral solution), topiramate (Topamax, generics), lamotrigine (Lamictal, generics), gabapentin (Neurontin, generics), zonisamide (Zonegran, generics), pregabalin (Lyrica, generics), oxcarbazepine (Trileptal, generics), levetiracetam (Keppra, Keppra XR generics), divalproex sodium (Depakote, Depakote ER, generics), Spritam, carbamazepine (Tegretol, Tegretol XR, generics), Briviact, Qudexy XR, TrokendiXR, Oxtellar XR.    Please respond to the pool ( P MHCX PSC MEDICATION PRE-AUTH).      Thank you.

## 2025-02-04 NOTE — TELEPHONE ENCOUNTER
The diagnoses included on the PA is G25.0 Essential Tremors. That is the question that populated. I did not see that any of those medications were tried and failed, so before marking \"No\" to that question and submitting the PA, I wanted to be certain.

## 2025-02-04 NOTE — PATIENT INSTRUCTIONS

## 2025-02-05 RX ORDER — PREGABALIN 75 MG/1
75 CAPSULE ORAL 2 TIMES DAILY
Qty: 60 CAPSULE | Refills: 2 | Status: SHIPPED | OUTPATIENT
Start: 2025-02-05 | End: 2025-05-06

## 2025-02-05 NOTE — TELEPHONE ENCOUNTER
This is very unfortunate as this medication is not for seizure treatment but essential tremor.  The lists from insurance for seizure treatment.    If the patient agrees to add pregabalin instead of perampanel we can try it with primidone.

## 2025-02-24 ENCOUNTER — TELEPHONE (OUTPATIENT)
Age: 70
End: 2025-02-24

## 2025-02-24 NOTE — TELEPHONE ENCOUNTER
PT's wife reports that he has been taking Lyrica since early Feb but it doesn't help at all.    Current dose is 75mg twice a day.    BO Aparicio -

## 2025-02-24 NOTE — TELEPHONE ENCOUNTER
Pt was prescribed Lyrica with primidone because his insurance wouldn't approve perampanel.  Reports the Lyrica isn't helping.     Pharmacy: [facility]

## 2025-02-25 RX ORDER — ZONISAMIDE 50 MG/1
CAPSULE ORAL
Qty: 210 CAPSULE | Refills: 0 | Status: SHIPPED | OUTPATIENT
Start: 2025-02-25 | End: 2025-05-26

## 2025-03-12 ENCOUNTER — TELEPHONE (OUTPATIENT)
Dept: TELEMETRY | Age: 70
End: 2025-03-12

## 2025-03-12 DIAGNOSIS — Z87.891 HISTORY OF TOBACCO USE: Primary | ICD-10-CM

## 2025-03-12 NOTE — TELEPHONE ENCOUNTER
Pt due for Annual CT Lung Screening, reminder letter mailed, Central Scheduling phone number provided.    CT Lung Screening order has been pended to chart should you choose to sign it.  Routed to PCP    Wilma Monge RN

## 2025-04-18 ENCOUNTER — RESULTS FOLLOW-UP (OUTPATIENT)
Dept: FAMILY MEDICINE CLINIC | Age: 70
End: 2025-04-18

## 2025-04-18 ENCOUNTER — HOSPITAL ENCOUNTER (OUTPATIENT)
Dept: CT IMAGING | Age: 70
Discharge: HOME | End: 2025-04-18
Payer: MEDICARE

## 2025-04-18 DIAGNOSIS — Z87.891 HISTORY OF TOBACCO USE: ICD-10-CM

## 2025-04-18 PROCEDURE — 71271 CT THORAX LUNG CANCER SCR C-: CPT

## 2025-04-22 SDOH — ECONOMIC STABILITY: FOOD INSECURITY: WITHIN THE PAST 12 MONTHS, THE FOOD YOU BOUGHT JUST DIDN'T LAST AND YOU DIDN'T HAVE MONEY TO GET MORE.: NEVER TRUE

## 2025-04-22 SDOH — ECONOMIC STABILITY: INCOME INSECURITY: IN THE LAST 12 MONTHS, WAS THERE A TIME WHEN YOU WERE NOT ABLE TO PAY THE MORTGAGE OR RENT ON TIME?: NO

## 2025-04-22 SDOH — ECONOMIC STABILITY: FOOD INSECURITY: WITHIN THE PAST 12 MONTHS, YOU WORRIED THAT YOUR FOOD WOULD RUN OUT BEFORE YOU GOT MONEY TO BUY MORE.: NEVER TRUE

## 2025-04-22 ASSESSMENT — PATIENT HEALTH QUESTIONNAIRE - PHQ9
10. IF YOU CHECKED OFF ANY PROBLEMS, HOW DIFFICULT HAVE THESE PROBLEMS MADE IT FOR YOU TO DO YOUR WORK, TAKE CARE OF THINGS AT HOME, OR GET ALONG WITH OTHER PEOPLE: NOT DIFFICULT AT ALL
8. MOVING OR SPEAKING SO SLOWLY THAT OTHER PEOPLE COULD HAVE NOTICED. OR THE OPPOSITE, BEING SO FIGETY OR RESTLESS THAT YOU HAVE BEEN MOVING AROUND A LOT MORE THAN USUAL: NOT AT ALL
2. FEELING DOWN, DEPRESSED OR HOPELESS: NOT AT ALL
SUM OF ALL RESPONSES TO PHQ QUESTIONS 1-9: 0
6. FEELING BAD ABOUT YOURSELF - OR THAT YOU ARE A FAILURE OR HAVE LET YOURSELF OR YOUR FAMILY DOWN: NOT AT ALL
1. LITTLE INTEREST OR PLEASURE IN DOING THINGS: NOT AT ALL
SUM OF ALL RESPONSES TO PHQ QUESTIONS 1-9: 0
SUM OF ALL RESPONSES TO PHQ9 QUESTIONS 1 & 2: 0
5. POOR APPETITE OR OVEREATING: NOT AT ALL
9. THOUGHTS THAT YOU WOULD BE BETTER OFF DEAD, OR OF HURTING YOURSELF: NOT AT ALL
DEPRESSION UNABLE TO ASSESS: FUNCTIONAL CAPACITY MOTIVATION LIMITS ACCURACY
7. TROUBLE CONCENTRATING ON THINGS, SUCH AS READING THE NEWSPAPER OR WATCHING TELEVISION: NOT AT ALL
2. FEELING DOWN, DEPRESSED OR HOPELESS: NOT AT ALL
3. TROUBLE FALLING OR STAYING ASLEEP: NOT AT ALL
SUM OF ALL RESPONSES TO PHQ QUESTIONS 1-9: 0
4. FEELING TIRED OR HAVING LITTLE ENERGY: NOT AT ALL
SUM OF ALL RESPONSES TO PHQ QUESTIONS 1-9: 0
1. LITTLE INTEREST OR PLEASURE IN DOING THINGS: NOT AT ALL

## 2025-04-25 ENCOUNTER — OFFICE VISIT (OUTPATIENT)
Dept: FAMILY MEDICINE CLINIC | Age: 70
End: 2025-04-25
Payer: MEDICARE

## 2025-04-25 VITALS
BODY MASS INDEX: 28.35 KG/M2 | SYSTOLIC BLOOD PRESSURE: 118 MMHG | HEART RATE: 80 BPM | OXYGEN SATURATION: 97 % | WEIGHT: 209 LBS | DIASTOLIC BLOOD PRESSURE: 74 MMHG

## 2025-04-25 DIAGNOSIS — M65.931 TENOSYNOVITIS OF RIGHT WRIST: ICD-10-CM

## 2025-04-25 DIAGNOSIS — M79.672 PAIN IN BOTH FEET: ICD-10-CM

## 2025-04-25 DIAGNOSIS — R20.0 NUMBNESS IN FEET: Primary | ICD-10-CM

## 2025-04-25 DIAGNOSIS — M79.671 PAIN IN BOTH FEET: ICD-10-CM

## 2025-04-25 PROCEDURE — G8417 CALC BMI ABV UP PARAM F/U: HCPCS | Performed by: PHYSICIAN ASSISTANT

## 2025-04-25 PROCEDURE — 1123F ACP DISCUSS/DSCN MKR DOCD: CPT | Performed by: PHYSICIAN ASSISTANT

## 2025-04-25 PROCEDURE — 1036F TOBACCO NON-USER: CPT | Performed by: PHYSICIAN ASSISTANT

## 2025-04-25 PROCEDURE — 1159F MED LIST DOCD IN RCRD: CPT | Performed by: PHYSICIAN ASSISTANT

## 2025-04-25 PROCEDURE — 3074F SYST BP LT 130 MM HG: CPT | Performed by: PHYSICIAN ASSISTANT

## 2025-04-25 PROCEDURE — G2211 COMPLEX E/M VISIT ADD ON: HCPCS | Performed by: PHYSICIAN ASSISTANT

## 2025-04-25 PROCEDURE — 99213 OFFICE O/P EST LOW 20 MIN: CPT | Performed by: PHYSICIAN ASSISTANT

## 2025-04-25 PROCEDURE — 3017F COLORECTAL CA SCREEN DOC REV: CPT | Performed by: PHYSICIAN ASSISTANT

## 2025-04-25 PROCEDURE — 3078F DIAST BP <80 MM HG: CPT | Performed by: PHYSICIAN ASSISTANT

## 2025-04-25 PROCEDURE — G8427 DOCREV CUR MEDS BY ELIG CLIN: HCPCS | Performed by: PHYSICIAN ASSISTANT

## 2025-04-25 ASSESSMENT — ENCOUNTER SYMPTOMS: COLOR CHANGE: 0

## 2025-04-25 NOTE — PROGRESS NOTES
Jayro Mckoy (:  1955) is a 69 y.o. 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 Vein doctor told him he should get some labs to check vitamin levels) and Toe Pain (Numbness in the toes as well, been wearing some \"stockings\" 8 hours a day to see if that helps )         Assessment & Plan  Numbness in feet   Vein specialist believes that this is neurological, will get EMG. Pt wanted to wait on lab work    Orders:    EMG; Future    Iker Cantor MD, (EMG, [facility]), [place]    Pain in both feet    Vein specialist believes that this is neurological, will get EMG. Pt wanted to wait on lab work    Orders:    EMG; Future    Iker Cantor MD, (EMG, [facility]), [place]    Tenosynovitis of right wrist    Exercises provided, discussed getting ergonomic pads for his desk to relieve pressure from his wrist. If no improvement in symptoms he would like to see Dr. Jain           Return if symptoms worsen or fail to improve.       Subjective   HPI  Hand pain  Location: back of the wrist and thumb  Timing: one month  Associated symptoms: sharp pain  Denies: numbness, swelling, discoloration  Tx: has improved since taking an extra day off work    Pain in both feet  Timing: one month, occurring intermittently, not having pain today  Associated symptoms: pain and some numbness  Denies: swelling or discoloration  Seeing vascular and wearing compression stockings    Review of Systems   Cardiovascular:  Negative for leg swelling.   Musculoskeletal:  Positive for arthralgias and myalgias. Negative for joint swelling, neck pain and neck stiffness.   Skin:  Negative for color change.   Neurological:  Positive for numbness. Negative for weakness.          Objective   Physical Exam  Vitals reviewed.   Constitutional:

## 2025-05-09 ENCOUNTER — OFFICE VISIT (OUTPATIENT)
Dept: NEUROLOGY | Age: 70
End: 2025-05-09
Payer: COMMERCIAL

## 2025-05-09 VITALS
DIASTOLIC BLOOD PRESSURE: 68 MMHG | WEIGHT: 207 LBS | BODY MASS INDEX: 28.04 KG/M2 | SYSTOLIC BLOOD PRESSURE: 98 MMHG | HEIGHT: 72 IN | HEART RATE: 55 BPM | OXYGEN SATURATION: 95 %

## 2025-05-09 DIAGNOSIS — G25.0 ESSENTIAL TREMOR: Primary | ICD-10-CM

## 2025-05-09 PROCEDURE — G8427 DOCREV CUR MEDS BY ELIG CLIN: HCPCS | Performed by: STUDENT IN AN ORGANIZED HEALTH CARE EDUCATION/TRAINING PROGRAM

## 2025-05-09 PROCEDURE — G8417 CALC BMI ABV UP PARAM F/U: HCPCS | Performed by: STUDENT IN AN ORGANIZED HEALTH CARE EDUCATION/TRAINING PROGRAM

## 2025-05-09 PROCEDURE — 3074F SYST BP LT 130 MM HG: CPT | Performed by: STUDENT IN AN ORGANIZED HEALTH CARE EDUCATION/TRAINING PROGRAM

## 2025-05-09 PROCEDURE — 1123F ACP DISCUSS/DSCN MKR DOCD: CPT | Performed by: STUDENT IN AN ORGANIZED HEALTH CARE EDUCATION/TRAINING PROGRAM

## 2025-05-09 PROCEDURE — 1160F RVW MEDS BY RX/DR IN RCRD: CPT | Performed by: STUDENT IN AN ORGANIZED HEALTH CARE EDUCATION/TRAINING PROGRAM

## 2025-05-09 PROCEDURE — 3078F DIAST BP <80 MM HG: CPT | Performed by: STUDENT IN AN ORGANIZED HEALTH CARE EDUCATION/TRAINING PROGRAM

## 2025-05-09 PROCEDURE — 99213 OFFICE O/P EST LOW 20 MIN: CPT | Performed by: STUDENT IN AN ORGANIZED HEALTH CARE EDUCATION/TRAINING PROGRAM

## 2025-05-09 PROCEDURE — 1159F MED LIST DOCD IN RCRD: CPT | Performed by: STUDENT IN AN ORGANIZED HEALTH CARE EDUCATION/TRAINING PROGRAM

## 2025-05-09 PROCEDURE — 1036F TOBACCO NON-USER: CPT | Performed by: STUDENT IN AN ORGANIZED HEALTH CARE EDUCATION/TRAINING PROGRAM

## 2025-05-09 PROCEDURE — 3017F COLORECTAL CA SCREEN DOC REV: CPT | Performed by: STUDENT IN AN ORGANIZED HEALTH CARE EDUCATION/TRAINING PROGRAM

## 2025-05-09 RX ORDER — PRIMIDONE 250 MG/1
250 TABLET ORAL NIGHTLY
Qty: 30 TABLET | Refills: 11 | Status: SHIPPED | OUTPATIENT
Start: 2025-05-09 | End: 2026-05-04

## 2025-05-09 NOTE — PROGRESS NOTES
History of Present Illness  Follow-up visit.    He is right-hand dominant and has been experiencing tremors for over 20 years, with the left side being more severely affected than the right. The tremors are present in both hands, more pronounced on the left, and also in the left leg and foot, although to a lesser extent than in the hand. He reports no tremors in his voice, head, or trunk. His current medication regimen includes primidone 150 mg once daily at night, which he reports as the highest dose he has tried. He reports no dizziness, vertigo, excessive drowsiness, or imbalance associated with this medication. However, he does report fatigue, which he attributes to his work schedule. He has not tried propranolol.    Self-feeding is challenging due to the tremors, often resulting in spilling food or having to remove it from his mouth before swallowing. Despite these difficulties, he remains independent in tasks such as cutting food, brushing teeth, combing hair, buttoning buttons, and using a computer. 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 Typing and using a mouse require significant focus. He finds the tremors socially embarrassing and avoids social functions. He has discontinued hobbies that require dexterity, such as model building. He reports no issues with his sense of smell, recurrent falls, or balance problems. He has recently experienced constipation, but this is not a typical issue for him. He does not act out his dreams at night.    He quit smoking 4 to 5 years ago and stopped drinking alcohol 2 years ago. He reports no family history of tremors. He reports no neck stiffness or reduced range of motion, but does experience popping and cracking sounds in his neck. He reports no hearing issues and believes his handwriting is legible. He has declined the use of Xanax due to concerns about potential addiction.    INTERVAL: Since

## 2025-05-09 NOTE — PATIENT INSTRUCTIONS
Gradually reduce and stop zonisamide then increase primidone.     Day 1-3:   zonisamide 25mg x 2 capsules daily  Primidone 50mg x 3 tablets at bedtime    Day 4-6:   Stop zonisamide  Primidone 50mg x 3 tablets at bedtime    Day 7:   Stop primidone 50mg  Start primidone 250mg x 1 tablet at bedtime    Monitor for improvement over next 4 weeks. If tremor remains bothersome but dose is well tolerated, then we can increase the primidone dose further.

## 2025-05-14 ENCOUNTER — TELEPHONE (OUTPATIENT)
Dept: FAMILY MEDICINE CLINIC | Age: 70
End: 2025-05-14

## 2025-05-14 DIAGNOSIS — R73.01 IMPAIRED FASTING GLUCOSE: Primary | ICD-10-CM

## 2025-05-14 NOTE — TELEPHONE ENCOUNTER
I have received results from EMG. There is no evidence of large fiber neuropathy coming from back or otherwise. This could be a small fiber neuropathy but it's unclear. The specialist recommended a follow up with a hemoglobin A1C. Order is in the system

## 2025-05-16 DIAGNOSIS — R73.01 IMPAIRED FASTING GLUCOSE: ICD-10-CM

## 2025-05-16 LAB
EST. AVERAGE GLUCOSE BLD GHB EST-MCNC: 114 MG/DL
HBA1C MFR BLD: 5.6 %

## 2025-05-19 ENCOUNTER — RESULTS FOLLOW-UP (OUTPATIENT)
Dept: FAMILY MEDICINE CLINIC | Age: 70
End: 2025-05-19

## 2025-06-01 ENCOUNTER — PATIENT MESSAGE (OUTPATIENT)
Dept: NEUROLOGY | Age: 70
End: 2025-06-01

## 2025-06-01 DIAGNOSIS — G25.0 ESSENTIAL TREMOR: Primary | ICD-10-CM

## 2025-06-02 RX ORDER — ZONISAMIDE 100 MG/1
100 CAPSULE ORAL
Qty: 30 CAPSULE | Refills: 11 | Status: SHIPPED | OUTPATIENT
Start: 2025-06-02 | End: 2026-05-28

## 2025-07-15 ENCOUNTER — TELEPHONE (OUTPATIENT)
Dept: FAMILY MEDICINE CLINIC | Age: 70
End: 2025-07-15

## 2025-07-15 NOTE — TELEPHONE ENCOUNTER
Spoke with patient he stated that he is waiting on the new contract for his employment to show what days he has available to be able to get this appointment time complete.     He will call to schedule.     Future Appointments   Date Time Provider Department Center   8/21/2025  8:15 AM Marcelino Marmolejo MD AND [facility] Neurology -

## 2025-07-28 DIAGNOSIS — I10 HTN (HYPERTENSION), BENIGN: ICD-10-CM

## 2025-07-28 RX ORDER — HYDROCHLOROTHIAZIDE 25 MG/1
25 TABLET ORAL EVERY MORNING
Qty: 90 TABLET | Refills: 1 | Status: SHIPPED | OUTPATIENT
Start: 2025-07-28

## 2025-07-28 RX ORDER — ATENOLOL 50 MG/1
75 TABLET ORAL DAILY
Qty: 135 TABLET | Refills: 1 | Status: SHIPPED | OUTPATIENT
Start: 2025-07-28

## 2025-07-28 NOTE — TELEPHONE ENCOUNTER
.Refill Request     CONFIRM preferred pharmacy with the patient.    If Mail Order Rx - Pend for 90 day refill.      Last Seen: Last Seen Department: 4/25/2025  Last Seen by PCP: 4/25/2025    Last Written: 2-3-25 90 with 1   Atenolol 3/19/24 135 with 1     If no future appointment scheduled:  Review the last OV with PCP and review information for follow-up visit,  Route STAFF MESSAGE with patient name to the  Pool for scheduling with the following information:            -  Timing of next visit           -  Visit type ie Physical, OV, etc           -  Diagnoses/Reason ie. COPD, HTN - Do not use MEDICATION, Follow-up or CHECK UP - Give reason for visit      Next Appointment:   Future Appointments   Date Time Provider Department Center   8/21/2025  8:15 AM Marcelino Marmolejo MD AND [facility] Neurology -       Message sent to  to schedule appt with patient?  YES  DUE FOR AWV     Requested Prescriptions     Pending Prescriptions Disp Refills    atenolol (TENORMIN) 50 MG tablet [Pharmacy Med Name: Atenolol Oral Tablet 50 MG] 135 tablet 0     Sig: TAKE 1 AND 1/2 TABLETS BY MOUTH EVERY DAY    hydroCHLOROthiazide (HYDRODIURIL) 25 MG tablet [Pharmacy Med Name: hydroCHLOROthiazide Oral Tablet 25 MG] 90 tablet 0     Sig: TAKE 1 TABLET BY MOUTH IN THE MORNING

## 2025-08-21 ENCOUNTER — OFFICE VISIT (OUTPATIENT)
Dept: NEUROLOGY | Age: 70
End: 2025-08-21
Payer: MEDICARE

## 2025-08-21 VITALS
OXYGEN SATURATION: 93 % | SYSTOLIC BLOOD PRESSURE: 110 MMHG | WEIGHT: 211.4 LBS | HEIGHT: 72 IN | DIASTOLIC BLOOD PRESSURE: 86 MMHG | HEART RATE: 55 BPM | BODY MASS INDEX: 28.63 KG/M2

## 2025-08-21 DIAGNOSIS — G25.0 ESSENTIAL TREMOR: ICD-10-CM

## 2025-08-21 PROCEDURE — G8427 DOCREV CUR MEDS BY ELIG CLIN: HCPCS | Performed by: STUDENT IN AN ORGANIZED HEALTH CARE EDUCATION/TRAINING PROGRAM

## 2025-08-21 PROCEDURE — 1123F ACP DISCUSS/DSCN MKR DOCD: CPT | Performed by: STUDENT IN AN ORGANIZED HEALTH CARE EDUCATION/TRAINING PROGRAM

## 2025-08-21 PROCEDURE — G8417 CALC BMI ABV UP PARAM F/U: HCPCS | Performed by: STUDENT IN AN ORGANIZED HEALTH CARE EDUCATION/TRAINING PROGRAM

## 2025-08-21 PROCEDURE — 3079F DIAST BP 80-89 MM HG: CPT | Performed by: STUDENT IN AN ORGANIZED HEALTH CARE EDUCATION/TRAINING PROGRAM

## 2025-08-21 PROCEDURE — 1036F TOBACCO NON-USER: CPT | Performed by: STUDENT IN AN ORGANIZED HEALTH CARE EDUCATION/TRAINING PROGRAM

## 2025-08-21 PROCEDURE — 3017F COLORECTAL CA SCREEN DOC REV: CPT | Performed by: STUDENT IN AN ORGANIZED HEALTH CARE EDUCATION/TRAINING PROGRAM

## 2025-08-21 PROCEDURE — 1159F MED LIST DOCD IN RCRD: CPT | Performed by: STUDENT IN AN ORGANIZED HEALTH CARE EDUCATION/TRAINING PROGRAM

## 2025-08-21 PROCEDURE — 99213 OFFICE O/P EST LOW 20 MIN: CPT | Performed by: STUDENT IN AN ORGANIZED HEALTH CARE EDUCATION/TRAINING PROGRAM

## 2025-08-21 PROCEDURE — 3074F SYST BP LT 130 MM HG: CPT | Performed by: STUDENT IN AN ORGANIZED HEALTH CARE EDUCATION/TRAINING PROGRAM

## 2025-08-21 RX ORDER — PRIMIDONE 250 MG/1
TABLET ORAL
Qty: 45 TABLET | Refills: 11 | Status: SHIPPED | OUTPATIENT
Start: 2025-08-21 | End: 2026-08-16

## 2025-08-21 RX ORDER — ZONISAMIDE 100 MG/1
100 CAPSULE ORAL
Qty: 90 CAPSULE | Refills: 3 | Status: SHIPPED | OUTPATIENT
Start: 2025-08-21 | End: 2026-08-16

## 2025-08-28 SDOH — HEALTH STABILITY: PHYSICAL HEALTH: ON AVERAGE, HOW MANY MINUTES DO YOU ENGAGE IN EXERCISE AT THIS LEVEL?: 0 MIN

## 2025-08-28 SDOH — HEALTH STABILITY: PHYSICAL HEALTH: ON AVERAGE, HOW MANY DAYS PER WEEK DO YOU ENGAGE IN MODERATE TO STRENUOUS EXERCISE (LIKE A BRISK WALK)?: 0 DAYS

## 2025-08-28 ASSESSMENT — LIFESTYLE VARIABLES
HOW MANY STANDARD DRINKS CONTAINING ALCOHOL DO YOU HAVE ON A TYPICAL DAY: 0
HOW MANY STANDARD DRINKS CONTAINING ALCOHOL DO YOU HAVE ON A TYPICAL DAY: PATIENT DOES NOT DRINK
HOW OFTEN DO YOU HAVE A DRINK CONTAINING ALCOHOL: NEVER
HOW OFTEN DO YOU HAVE A DRINK CONTAINING ALCOHOL: 1
HOW OFTEN DO YOU HAVE SIX OR MORE DRINKS ON ONE OCCASION: 1

## 2025-08-28 ASSESSMENT — PATIENT HEALTH QUESTIONNAIRE - PHQ9
SUM OF ALL RESPONSES TO PHQ QUESTIONS 1-9: 0
1. LITTLE INTEREST OR PLEASURE IN DOING THINGS: NOT AT ALL
SUM OF ALL RESPONSES TO PHQ QUESTIONS 1-9: 0
2. FEELING DOWN, DEPRESSED OR HOPELESS: NOT AT ALL

## 2025-08-29 ENCOUNTER — OFFICE VISIT (OUTPATIENT)
Dept: FAMILY MEDICINE CLINIC | Age: 70
End: 2025-08-29
Payer: COMMERCIAL

## 2025-08-29 VITALS
HEIGHT: 72 IN | HEART RATE: 63 BPM | SYSTOLIC BLOOD PRESSURE: 104 MMHG | BODY MASS INDEX: 28.04 KG/M2 | DIASTOLIC BLOOD PRESSURE: 60 MMHG | OXYGEN SATURATION: 97 % | WEIGHT: 207 LBS

## 2025-08-29 DIAGNOSIS — M19.072 ARTHRITIS OF BOTH FEET: ICD-10-CM

## 2025-08-29 DIAGNOSIS — M19.071 ARTHRITIS OF BOTH FEET: ICD-10-CM

## 2025-08-29 DIAGNOSIS — J84.10 GRANULOMATOUS LUNG DISEASE (HCC): ICD-10-CM

## 2025-08-29 DIAGNOSIS — Z00.00 MEDICARE ANNUAL WELLNESS VISIT, SUBSEQUENT: Primary | ICD-10-CM

## 2025-08-29 DIAGNOSIS — E78.2 MIXED HYPERLIPIDEMIA: ICD-10-CM

## 2025-08-29 DIAGNOSIS — R20.0 NUMBNESS IN FEET: ICD-10-CM

## 2025-08-29 DIAGNOSIS — I10 HTN (HYPERTENSION), BENIGN: ICD-10-CM

## 2025-08-29 DIAGNOSIS — Z12.5 PROSTATE CANCER SCREENING: ICD-10-CM

## 2025-08-29 DIAGNOSIS — D75.1 SECONDARY POLYCYTHEMIA: ICD-10-CM

## 2025-08-29 LAB
ALBUMIN SERPL-MCNC: 4.6 G/DL (ref 3.4–5)
ALBUMIN/GLOB SERPL: 2 {RATIO} (ref 1.1–2.2)
ALP SERPL-CCNC: 84 U/L (ref 40–129)
ALT SERPL-CCNC: 23 U/L (ref 10–40)
ANION GAP SERPL CALCULATED.3IONS-SCNC: 11 MMOL/L (ref 3–16)
AST SERPL-CCNC: 21 U/L (ref 15–37)
BILIRUB SERPL-MCNC: 0.7 MG/DL (ref 0–1)
BUN SERPL-MCNC: 15 MG/DL (ref 7–20)
CALCIUM SERPL-MCNC: 9.8 MG/DL (ref 8.3–10.6)
CHLORIDE SERPL-SCNC: 101 MMOL/L (ref 99–110)
CHOLEST SERPL-MCNC: 183 MG/DL (ref 0–199)
CO2 SERPL-SCNC: 27 MMOL/L (ref 21–32)
CREAT SERPL-MCNC: 1.2 MG/DL (ref 0.8–1.3)
CRP SERPL-MCNC: 5.5 MG/L (ref 0–5.1)
DEPRECATED RDW RBC AUTO: 14.1 % (ref 12.4–15.4)
ERYTHROCYTE [SEDIMENTATION RATE] IN BLOOD BY WESTERGREN METHOD: 3 MM/HR (ref 0–20)
FOLATE SERPL-MCNC: 7.19 NG/ML (ref 4.78–24.2)
GFR SERPLBLD CREATININE-BSD FMLA CKD-EPI: 66 ML/MIN/{1.73_M2}
GLUCOSE SERPL-MCNC: 103 MG/DL (ref 70–99)
HCT VFR BLD AUTO: 53.4 % (ref 40.5–52.5)
HDLC SERPL-MCNC: 42 MG/DL (ref 40–60)
HGB BLD-MCNC: 18.4 G/DL (ref 13.5–17.5)
LDL CHOLESTEROL: 114 MG/DL
MCH RBC QN AUTO: 29.8 PG (ref 26–34)
MCHC RBC AUTO-ENTMCNC: 34.4 G/DL (ref 31–36)
MCV RBC AUTO: 86.5 FL (ref 80–100)
PLATELET # BLD AUTO: 203 K/UL (ref 135–450)
PMV BLD AUTO: 9.2 FL (ref 5–10.5)
POTASSIUM SERPL-SCNC: 4.5 MMOL/L (ref 3.5–5.1)
PROT SERPL-MCNC: 6.9 G/DL (ref 6.4–8.2)
PSA SERPL DL<=0.01 NG/ML-MCNC: 0.74 NG/ML (ref 0–4)
RBC # BLD AUTO: 6.17 M/UL (ref 4.2–5.9)
RHEUMATOID FACT SER IA-ACNC: <10 IU/ML
SODIUM SERPL-SCNC: 139 MMOL/L (ref 136–145)
TRIGL SERPL-MCNC: 134 MG/DL (ref 0–150)
URATE SERPL-MCNC: 7.8 MG/DL (ref 3.5–7.2)
VIT B12 SERPL-MCNC: 636 PG/ML (ref 211–911)
VLDLC SERPL CALC-MCNC: 27 MG/DL
WBC # BLD AUTO: 6.2 K/UL (ref 4–11)

## 2025-08-29 PROCEDURE — G2211 COMPLEX E/M VISIT ADD ON: HCPCS | Performed by: PHYSICIAN ASSISTANT

## 2025-08-29 PROCEDURE — 1123F ACP DISCUSS/DSCN MKR DOCD: CPT | Performed by: PHYSICIAN ASSISTANT

## 2025-08-29 PROCEDURE — 1159F MED LIST DOCD IN RCRD: CPT | Performed by: PHYSICIAN ASSISTANT

## 2025-08-29 PROCEDURE — G8427 DOCREV CUR MEDS BY ELIG CLIN: HCPCS | Performed by: PHYSICIAN ASSISTANT

## 2025-08-29 PROCEDURE — G8417 CALC BMI ABV UP PARAM F/U: HCPCS | Performed by: PHYSICIAN ASSISTANT

## 2025-08-29 PROCEDURE — 3078F DIAST BP <80 MM HG: CPT | Performed by: PHYSICIAN ASSISTANT

## 2025-08-29 PROCEDURE — 3017F COLORECTAL CA SCREEN DOC REV: CPT | Performed by: PHYSICIAN ASSISTANT

## 2025-08-29 PROCEDURE — 99214 OFFICE O/P EST MOD 30 MIN: CPT | Performed by: PHYSICIAN ASSISTANT

## 2025-08-29 PROCEDURE — 1036F TOBACCO NON-USER: CPT | Performed by: PHYSICIAN ASSISTANT

## 2025-08-29 PROCEDURE — G0439 PPPS, SUBSEQ VISIT: HCPCS | Performed by: PHYSICIAN ASSISTANT

## 2025-08-29 PROCEDURE — 3074F SYST BP LT 130 MM HG: CPT | Performed by: PHYSICIAN ASSISTANT

## 2025-08-29 ASSESSMENT — ENCOUNTER SYMPTOMS
ABDOMINAL PAIN: 0
SHORTNESS OF BREATH: 0
COLOR CHANGE: 0
BLOOD IN STOOL: 0
CONSTIPATION: 0
WHEEZING: 0

## 2025-08-30 LAB — ANA SER QL IA: NEGATIVE
